# Patient Record
Sex: FEMALE | Race: WHITE | Employment: UNEMPLOYED | URBAN - METROPOLITAN AREA
[De-identification: names, ages, dates, MRNs, and addresses within clinical notes are randomized per-mention and may not be internally consistent; named-entity substitution may affect disease eponyms.]

---

## 2017-06-05 ENCOUNTER — HOSPITAL ENCOUNTER (OUTPATIENT)
Dept: CT IMAGING | Age: 52
Discharge: HOME | End: 2017-06-05
Payer: SELF-PAY

## 2017-06-05 DIAGNOSIS — Z13.6 SCREENING FOR ISCHEMIC HEART DISEASE: ICD-10-CM

## 2017-06-05 PROCEDURE — 75571 CT HRT W/O DYE W/CA TEST: CPT

## 2017-06-08 ENCOUNTER — TELEPHONE (OUTPATIENT)
Dept: CARDIAC REHAB | Age: 52
End: 2017-06-08

## 2017-06-08 NOTE — TELEPHONE ENCOUNTER
Called patient to review CT scan results. Verified patient's date of birth. Discussed results of CT scan. Her calcium score is 0. This corresponds to no plaque noted in the coronary arteries. Her risk for a heart attack is very low. The chance of patient developing heart disease at this point is less than 1%. Patient verbalized understanding of results. Will mail report to patient as she is in process of getting a new PCP. She stated that she will take the results to her new PCP at first visit.

## 2018-01-24 ENCOUNTER — OFFICE VISIT (OUTPATIENT)
Dept: ORTHOPEDIC SURGERY | Age: 53
End: 2018-01-24

## 2018-09-18 ENCOUNTER — HOSPITAL ENCOUNTER (OUTPATIENT)
Dept: LAB | Age: 53
Discharge: HOME | End: 2018-09-18

## 2018-09-18 ENCOUNTER — HOSPITAL ENCOUNTER (OUTPATIENT)
Dept: GENERAL RADIOLOGY | Age: 53
Discharge: HOME | End: 2018-09-18
Payer: COMMERCIAL

## 2018-09-18 ENCOUNTER — OFFICE VISIT (OUTPATIENT)
Dept: INTERNAL MEDICINE CLINIC | Age: 53
End: 2018-09-18

## 2018-09-18 VITALS
OXYGEN SATURATION: 98 % | SYSTOLIC BLOOD PRESSURE: 121 MMHG | HEART RATE: 82 BPM | HEIGHT: 63 IN | DIASTOLIC BLOOD PRESSURE: 81 MMHG | BODY MASS INDEX: 37.03 KG/M2 | TEMPERATURE: 96.9 F | WEIGHT: 209 LBS | RESPIRATION RATE: 16 BRPM

## 2018-09-18 DIAGNOSIS — Z12.11 SCREENING FOR COLON CANCER: ICD-10-CM

## 2018-09-18 DIAGNOSIS — E89.0 POSTOPERATIVE HYPOTHYROIDISM: ICD-10-CM

## 2018-09-18 DIAGNOSIS — M79.641 RIGHT HAND PAIN: ICD-10-CM

## 2018-09-18 DIAGNOSIS — J45.20 MILD INTERMITTENT ASTHMA WITHOUT COMPLICATION: ICD-10-CM

## 2018-09-18 DIAGNOSIS — K92.1 HEMATOCHEZIA: ICD-10-CM

## 2018-09-18 DIAGNOSIS — Z13.0 SCREENING FOR DEFICIENCY ANEMIA: ICD-10-CM

## 2018-09-18 DIAGNOSIS — E78.5 HYPERLIPIDEMIA, UNSPECIFIED HYPERLIPIDEMIA TYPE: ICD-10-CM

## 2018-09-18 DIAGNOSIS — E66.01 SEVERE OBESITY (BMI 35.0-39.9): ICD-10-CM

## 2018-09-18 DIAGNOSIS — Z12.31 ENCOUNTER FOR SCREENING MAMMOGRAM FOR BREAST CANCER: ICD-10-CM

## 2018-09-18 DIAGNOSIS — E89.0 POSTOPERATIVE HYPOTHYROIDISM: Primary | ICD-10-CM

## 2018-09-18 PROBLEM — R79.89 ABNORMAL TSH: Status: RESOLVED | Noted: 2018-09-18 | Resolved: 2018-09-18

## 2018-09-18 PROBLEM — R79.89 ABNORMAL TSH: Status: ACTIVE | Noted: 2018-09-18

## 2018-09-18 LAB — SENTARA SPECIMEN COL,SENBCF: NORMAL

## 2018-09-18 PROCEDURE — 73130 X-RAY EXAM OF HAND: CPT

## 2018-09-18 PROCEDURE — 99001 SPECIMEN HANDLING PT-LAB: CPT

## 2018-09-18 RX ORDER — PROMETHAZINE HYDROCHLORIDE 25 MG/1
25 TABLET ORAL
COMMUNITY
Start: 2016-10-19 | End: 2018-09-18 | Stop reason: ALTCHOICE

## 2018-09-18 RX ORDER — BENZONATATE 200 MG/1
200 CAPSULE ORAL
COMMUNITY
Start: 2014-03-21 | End: 2018-09-18 | Stop reason: ALTCHOICE

## 2018-09-18 RX ORDER — VALACYCLOVIR HYDROCHLORIDE 1 G/1
TABLET, FILM COATED ORAL
COMMUNITY
Start: 2018-01-08 | End: 2018-09-18 | Stop reason: ALTCHOICE

## 2018-09-18 RX ORDER — ALBUTEROL SULFATE 90 UG/1
AEROSOL, METERED RESPIRATORY (INHALATION)
COMMUNITY

## 2018-09-18 RX ORDER — DESVENLAFAXINE 100 MG/1
TABLET, EXTENDED RELEASE ORAL
COMMUNITY
Start: 2016-11-28 | End: 2018-09-18 | Stop reason: ALTCHOICE

## 2018-09-18 RX ORDER — MECLIZINE HYDROCHLORIDE 25 MG/1
25 TABLET ORAL
COMMUNITY
Start: 2016-10-19 | End: 2018-09-18 | Stop reason: ALTCHOICE

## 2018-09-18 RX ORDER — BUPROPION HYDROCHLORIDE 150 MG/1
1 TABLET ORAL
COMMUNITY
Start: 2014-03-21 | End: 2018-09-18 | Stop reason: ALTCHOICE

## 2018-09-18 RX ORDER — DIPHENHYDRAMINE HCL 25 MG
25 CAPSULE ORAL
COMMUNITY
End: 2018-09-25

## 2018-09-18 RX ORDER — LEVOTHYROXINE SODIUM 125 UG/1
TABLET ORAL
COMMUNITY
Start: 2012-03-26 | End: 2018-09-18 | Stop reason: ALTCHOICE

## 2018-09-18 RX ORDER — ZOLPIDEM TARTRATE 10 MG/1
10 TABLET ORAL
COMMUNITY
Start: 2015-08-04 | End: 2018-09-18 | Stop reason: ALTCHOICE

## 2018-09-18 RX ORDER — FLUTICASONE PROPIONATE 220 UG/1
2 AEROSOL, METERED RESPIRATORY (INHALATION)
COMMUNITY
Start: 2016-12-21 | End: 2018-09-18 | Stop reason: ALTCHOICE

## 2018-09-18 RX ORDER — PREDNISONE 20 MG/1
20 TABLET ORAL
COMMUNITY
Start: 2016-12-15 | End: 2018-09-18 | Stop reason: ALTCHOICE

## 2018-09-18 RX ORDER — INDOMETHACIN 50 MG/1
50 CAPSULE ORAL
COMMUNITY
Start: 2015-03-23 | End: 2018-09-18 | Stop reason: ALTCHOICE

## 2018-09-18 RX ORDER — PHENTERMINE HYDROCHLORIDE 37.5 MG/1
1 CAPSULE ORAL
COMMUNITY
Start: 2013-02-15 | End: 2018-09-18 | Stop reason: ALTCHOICE

## 2018-09-18 RX ORDER — LEVOTHYROXINE SODIUM 150 MCG
150-300 TABLET ORAL
COMMUNITY
Start: 2018-09-10 | End: 2019-07-24 | Stop reason: SDUPTHER

## 2018-09-18 NOTE — PROGRESS NOTES
INTERNISTS [facility]: 
9/19/2018, MRN: [id number] Amalia Antonio is a 48 y.o. female and presents to clinic to Establish Care; Hand Swelling (x 2-3 weeks right hand); and Hand Pain (x 2-3 weeks right hand) Subjective: The pt is a 54yo female with h/o HLD, asthma, and hypothyroidism (postoperative s/p thyroidectomy for multinodular goiter hx; followed by [facility] Endocrinology team). 1. Hypothyroidism: She has a h/o abnormal TSH per review of the EHRs ([facility]) in 2016. Her TSH was in the 6 range. Her fT4 and fT3 were unremarkable at that time. She has had hypothyroidism for yrs. She is followed every 6 months by Endocrinology at [facility]. She has a multinodular goiter hx. S/p thyroidectomy. 2. HLD: Her cholesterol in 2015 was >200 per review of the [facility]. Her weight is 209 lbs today. 3. Asthma: Present for yrs. She used to take flovent. She rarely uses her albuterol inhaler. She gets \"bronchiitis\" every autumn and needs cough rx and albuterol prn. No h/o allergic rhinitis. She has cats/dogs. 4. Health Maintenance: 
- Last mammogram: 3/18. Unremarkable per pt hx. No breast pain/masses. - Last colon cancer screening: Not done. She had an episode of BRBPR (which has never happened before) 6 months ago. Not associated with cramping. She had BRBPR in the toilet bowl and not on the paper with wiping.  
- Last PAP: S/p hysterectomy. No bleeding since then. - Diet: No restrictions. - Exercise: She does not regularly exercise - Tobacco use: None 
- ETOH use: Rarely she will have a wine cooler twice a month - Drug use: None - Energy drink consumption: None 5. Right Hand Pain: About a yr ago, she had a cat bite along her right hand. She received abx and a Tdap. Sx resolved with these rx. She reports a 3wk h/o pain along her 3rd and 4th MCP jt. No h/o trauma since the cat bite. No alleviating factors are known. Symptoms not worsening. They are persisting. Patient Active Problem List  
 Diagnosis Date Noted  Hyperlipidemia 09/18/2018  Postoperative hypothyroidism 09/18/2018  Mild intermittent asthma without complication [id number]/0264 Current Outpatient Prescriptions Medication Sig Dispense Refill  albuterol (PROVENTIL HFA, VENTOLIN HFA, PROAIR HFA) 90 mcg/actuation inhaler Inhale 2 puffs into the lungs every 6 hours as needed.  levothyroxine (SYNTHROID) 150 mcg tablet Take 1 tablet Mon-Sat and 1.5 tabs on Sunday. Take 30 minutes to 1 hour before breakfast    
 diphenhydrAMINE (BENADRYL) 25 mg capsule Take 25 mg by mouth every six (6) hours as needed. Allergies Allergen Reactions  Codeine Nausea and Vomiting and Other (comments)  Methylphenidate Hcl Other (comments) Heart palpations  Morphine Nausea Only  Other Medication Nausea and Vomiting All opiods  Penicillin G Other (comments)  Penicillins Other (comments) Pt doesn't recall reaction. Was told she had a reaction when she was a child Past Medical History:  
Diagnosis Date  Abnormal EKG  Abnormal Pap smear of cervix  Anemia  Asthma  Depression  Elevated BP without diagnosis of hypertension  Headache  Hypercholesterolemia  Thyroid disease Past Surgical History:  
Procedure Laterality Date  HX APPENDECTOMY  HX ADAMA AND BSO  HX THYROIDECTOMY  HX WISDOM TEETH EXTRACTION    
 x4 Family History Problem Relation Age of Onset  Parkinson's Disease Mother  Dementia Mother  Thyroid Cancer Mother [address] Cataract Mother  Hypertension Father  Diabetes Father  Arthritis-osteo Father  Atrial Fibrillation Brother  Hypertension Brother  Macular Degen Maternal Grandmother  Thyroid Cancer Maternal Grandmother  Cataract Maternal Grandmother  No Known Problems Maternal Grandfather  Arthritis-rheumatoid Paternal Grandmother  Diabetes Paternal Grandfather  Attention Deficit Hyperactivity Disorder Son  Anxiety Daughter  Thyroid Disease Daughter Social History Substance Use Topics  Smoking status: Never Smoker  Smokeless tobacco: Never Used  Alcohol use 0.0 - 0.6 oz/week 0 - 1 Glasses of wine per week ROS Review of Systems Constitutional: Negative for chills and fever. HENT: Negative for ear pain and sore throat. Eyes: Negative for blurred vision and pain. Respiratory: Negative for cough and shortness of breath. Cardiovascular: Negative for chest pain. Gastrointestinal: Negative for abdominal pain, blood in stool and melena. Genitourinary: Negative for dysuria. Musculoskeletal: Positive for joint pain. Negative for myalgias. Skin: Negative for rash. Neurological: Negative for tingling, focal weakness and headaches. Endo/Heme/Allergies: Does not bruise/bleed easily. Psychiatric/Behavioral: Negative for substance abuse. Objective Vitals:  
 09/18/18 1357 BP: 121/81 Pulse: 82 Resp: 16 Temp: 96.9 °F (36.1 °C) TempSrc: Oral  
SpO2: 98% Weight: 209 lb (94.8 kg) Height: 5' 2.99\" (1.6 m) PainSc:   3 PainLoc: Hand Physical Exam  
Constitutional: She is oriented to person, place, and time and well-developed, well-nourished, and in no distress. HENT:  
Head: Normocephalic and atraumatic. Right Ear: External ear normal.  
Left Ear: External ear normal.  
Nose: Nose normal.  
Mouth/Throat: Oropharynx is clear and moist. No oropharyngeal exudate. Clear TMs Eyes: Conjunctivae and EOM are normal. Pupils are equal, round, and reactive to light. Right eye exhibits no discharge. Left eye exhibits no discharge. No scleral icterus. Neck: Neck supple. Cardiovascular: Normal rate, regular rhythm, normal heart sounds and intact distal pulses. Exam reveals no gallop and no friction rub. No murmur heard. Pulmonary/Chest: Effort normal and breath sounds normal. No respiratory distress. She has no wheezes. She has no rales. Abdominal: Soft. Bowel sounds are normal. She exhibits no distension. There is no tenderness. There is no rebound and no guarding. Musculoskeletal: She exhibits no edema or tenderness (BUE except along the right hand 3rd and 4th MCP jts). No effusions. No prominence along MCP jts Lymphadenopathy:  
  She has no cervical adenopathy. Neurological: She is alert and oriented to person, place, and time. She exhibits normal muscle tone. Gait normal.  
Skin: Skin is warm and dry. No erythema. Psychiatric: Affect normal.  
Nursing note and vitals reviewed. Assessment/Plan: 1. Hyperlipidemia: Per [employer] EHR results. Weight is 209lbs today. Checking a lipid panel, CMP, and A1c. I encouraged her to reduce her processed food intake and to exercise regularly. I will recheck her weight at her follow-up appointment. ORDERS: 
- LIPID PANEL; Future - METABOLIC PANEL, COMPREHENSIVE; Future 
- HEMOGLOBIN A1C W/O EAG; Future 2. Health Maintenance: 
Ordering a CBC to screen for anemia Requesting her previous PCP records, vaccine records, last mammogram, and last Pap. Ordering a mammogram to screen for breast cancer. ORDERS: 
- CBC WITH AUTOMATED DIFF; Future - [facility] MAMMO BI SCREENING INCL CAD; Future 3. Right hand pain: PE findings are reassuring. Overuse injury? Checking a CRP, RF, ELOY, uric acid level, a right hand x-ray for completeness. Placing a referral to orthopedicsper pt request. Activity as tolerated. ORDERS: 
- C REACTIVE PROTEIN, QT; Future 
- RHEUMASSURE; Future - ELOY COMPREHENSIVE PANEL; Future - URIC ACID; Future - XR HAND RT MIN 3 V; Future Ken Estimable Hand [facility] 4. Postoperative hypothyroidism: S/p thyroidectomy for multinodular goiter hx. Checking TFTs, a CBC, and a CMP. Continue with Synthroid as prescribed. I encouraged her to continue following up with her Endocrinology team. 
Continue with albuterol as needed. Observation. ORDERS: 
- TSH AND FREE T4; Future - CBC WITH AUTOMATED DIFF; Future - METABOLIC PANEL, COMPREHENSIVE; Future - T3, FREE; Future 5. Mild intermittent asthma without complication: Stable. 6. BRBPR Hx: Also, she is overdue for colon cancer screening. Occurred 6 months ago in the absence of abdominal cramping and in the absence of straining/constipation sx. Placing a referral to GI for colonoscopy. ORDERS: 
- REFERRAL TO GASTROENTEROLOGY Health Maintenance Due Topic Date Due  Pneumococcal 19-64 Medium Risk (1 of 1 - PPSV23) 03/08/1986  
 DTaP/Tdap/Td series (1 - Tdap) 03/08/1988  PAP AKA CERVICAL CYTOLOGY  03/08/1988  BREAST CANCER SCRN MAMMOGRAM  03/08/2017  
 FOBT Q 1 YEAR AGE 50-75  03/08/2017  Influenza Age 5 to Adult  08/01/2018 Lab review: Labs are reviewed in the [facility] EHR. Labs ordered as mentioned above. I have discussed the diagnosis with the patient and the intended plan as seen in the above orders. The patient has received an after-visit summary and questions were answered concerning future plans. I have discussed medication side effects and warnings with the patient as well. I have reviewed the plan of care with the patient, accepted their input and they are in agreement with the treatment goals. All questions were answered. The patient understands the plan of care. Handouts provided today with above information. Pt instructed if symptoms worsen to call the office or report to the ED for continued care. Greater than 50% of the visit time was spent in counseling and/or coordination of care. Voice recognition was used to generate this report, which may have resulted in some phonetic based errors in grammar and contents.  Even though attempts were made to correct all the mistakes, some may have been missed, and remained in the body of the document. Follow-up Disposition: 
Return in about 4 weeks (around 10/16/2018) for lab results.  
 
Beverley Alonso MD

## 2018-09-18 NOTE — PATIENT INSTRUCTIONS
Patient was given a copy of the [employer] Direct[employer] form and understands to bring it in once completed. Health Maintenance Due Topic Date Due  
 DTaP/Tdap/Td series (1 - Tdap) 03/08/1988  PAP AKA CERVICAL CYTOLOGY  03/08/1988  BREAST CANCER SCRN MAMMOGRAM  03/08/2017  
 FOBT Q 1 YEAR AGE 50-75  03/08/2017  Influenza Age 5 to Adult  08/01/2018 Body Mass Index: Care Instructions Your Care Instructions Body mass index (BMI) can help you see if your weight is raising your risk for health problems. It uses a formula to compare how much you weigh with how tall you are. · A BMI lower than 18.5 is considered underweight. · A BMI between 18.5 and 24.9 is considered healthy. · A BMI between 25 and 29.9 is considered overweight. A BMI of 30 or higher is considered obese. If your BMI is in the normal range, it means that you have a lower risk for weight-related health problems. If your BMI is in the overweight or obese range, you may be at increased risk for weight-related health problems, such as high blood pressure, heart disease, stroke, arthritis or joint pain, and diabetes. If your BMI is in the underweight range, you may be at increased risk for health problems such as fatigue, lower protection (immunity) against illness, muscle loss, bone loss, hair loss, and hormone problems. BMI is just one measure of your risk for weight-related health problems. You may be at higher risk for health problems if you are not active, you eat an unhealthy diet, or you drink too much alcohol or use tobacco products. Follow-up care is a key part of your treatment and safety. Be sure to make and go to all appointments, and call your doctor if you are having problems. It's also a good idea to know your test results and keep a list of the medicines you take. How can you care for yourself at home? · Practice healthy eating habits.  This includes eating plenty of fruits, vegetables, whole grains, lean protein, and low-fat dairy. · If your doctor recommends it, get more exercise. Walking is a good choice. Bit by bit, increase the amount you walk every day. Try for at least 30 minutes on most days of the week. · Do not smoke. Smoking can increase your risk for health problems. If you need help quitting, talk to your doctor about stop-smoking programs and medicines. These can increase your chances of quitting for good. · Limit alcohol to 2 drinks a day for men and 1 drink a day for women. Too much alcohol can cause health problems. If you have a BMI higher than 25 · Your doctor may do other tests to check your risk for weight-related health problems. This may include measuring the distance around your waist. A waist measurement of more than 40 inches in men or 35 inches in women can increase the risk of weight-related health problems. · Talk with your doctor about steps you can take to stay healthy or improve your health. You may need to make lifestyle changes to lose weight and stay healthy, such as changing your diet and getting regular exercise. If you have a BMI lower than 18.5 · Your doctor may do other tests to check your risk for health problems. · Talk with your doctor about steps you can take to stay healthy or improve your health. You may need to make lifestyle changes to gain or maintain weight and stay healthy, such as getting more healthy foods in your diet and doing exercises to build muscle. Where can you learn more? Go to http://kaylyn-fara.info/. Enter S176 in the search box to learn more about \"Body Mass Index: Care Instructions. \" Current as of: October 9, 2017 Content Version: 11.7 © 2849-3471 [employer]. Care instructions adapted under license by [employer] (which disclaims liability or warranty for this information).  If you have questions about a medical condition or this instruction, always ask your healthcare professional. [place] [address] any warranty or liability for your use of this information.

## 2018-09-18 NOTE — MR AVS SNAPSHOT
[address] [facility] 
 
 
 [address], [address] [place] [address] [facility] 
[phone] Patient: Shawna Baldwin MRN: [id number] :1965 Visit Information Date & Time Provider Department Dept. Phone Encounter #  
 2018  2:00 PM Sara Geiger MD [facility] [id number] Follow-up Instructions Return in about 4 weeks (around 10/16/2018) for lab results. Your Appointments 10/19/2018  9:00 AM  
Office Visit with Sara Geiger MD  
[facility]: 4 week f/u  
 [address] [facility], [place] [address] [address]  
  
   
 [address], [address] [facility] Upcoming Health Maintenance Date Due DTaP/Tdap/Td series (1 - Tdap) 3/8/1988 PAP AKA CERVICAL CYTOLOGY 3/8/1988 BREAST CANCER SCRN MAMMOGRAM 3/8/2017 FOBT Q 1 YEAR AGE 50-75 3/8/2017 Influenza Age 5 to Adult 2018 Allergies as of 2018  Review Complete On: 2018 By: Sara Geiger MD  
  
 Severity Noted Reaction Type Reactions Codeine High 2008    Nausea and Vomiting, Other (comments) Methylphenidate Hcl High 2011    Other (comments) Heart palpations Morphine High 2011    Nausea Only Other Medication High 2011    Nausea and Vomiting All opiods Penicillin G High 2008    Other (comments) Penicillins High 2011    Other (comments) Pt doesn't recall reaction. Was told she had a reaction when she was a child Current Immunizations  Never Reviewed No immunizations on file. Not reviewed this visit You Were Diagnosed With   
  
 Codes Comments Postoperative hypothyroidism    -  Primary ICD-10-CM: E89.0 ICD-9-CM: 244.0 Hyperlipidemia, unspecified hyperlipidemia type     ICD-10-CM: E78.5 ICD-9-CM: 272.4 Screening for deficiency anemia     ICD-10-CM: Z13.0 ICD-9-CM: V78.1 Right hand pain     ICD-10-CM: M79.641 ICD-9-CM: 729.5 Mild intermittent asthma without complication     ZDT-31-ZH: J45.20 ICD-9-CM: 493.90 Screening for colon cancer     ICD-10-CM: Z12.11 ICD-9-CM: V76.51 Encounter for screening mammogram for breast cancer     ICD-10-CM: Z12.31 
ICD-9-CM: V76.12 Vitals BP Pulse Temp Resp Height(growth percentile) Weight(growth percentile) 121/81 (BP 1 Location: Right arm, BP Patient Position: Sitting) 82 96.9 °F (36.1 °C) (Oral) 16 5' 2.99\" (1.6 m) 209 lb (94.8 kg) SpO2 BMI OB Status Smoking Status 98% 37.03 kg/m2 Hysterectomy Never Smoker BMI and BSA Data Body Mass Index Body Surface Area  
 37.03 kg/m 2 2.05 m 2 Your Updated Medication List  
  
   
This list is accurate as of 9/18/18  3:22 PM.  Always use your most recent med list.  
  
  
  
  
 albuterol 90 mcg/actuation inhaler Commonly known as:  PROVENTIL HFA, VENTOLIN HFA, PROAIR HFA Inhale 2 puffs into the lungs every 6 hours as needed. BENADRYL 25 mg capsule Generic drug:  diphenhydrAMINE Take 25 mg by mouth every six (6) hours as needed. SYNTHROID 150 mcg tablet Generic drug:  levothyroxine Take 1 tablet Mon-Sat and 1.5 tabs on Sunday. Take 30 minutes to 1 hour before breakfast  
  
  
  
  
We Performed the Following REFERRAL TO GASTROENTEROLOGY [XJQ02 Custom] Follow-up Instructions Return in about 4 weeks (around 10/16/2018) for lab results. To-Do List   
 09/18/2018 Lab:  ELOY COMPREHENSIVE PANEL   
  
 09/18/2018 Lab:  C REACTIVE PROTEIN, QT   
  
 09/18/2018 Lab:  CBC WITH AUTOMATED DIFF Around 09/18/2018 Lab:  HEMOGLOBIN A1C W/O EAG   
  
 09/18/2018 Lab:  LIPID PANEL   
  
 09/18/2018 Lab:  METABOLIC PANEL, COMPREHENSIVE   
  
 09/18/2018 Lab:  [facility]   
  
 09/18/2018 Lab:  T3, FREE Around 09/18/2018   Lab:  TSH AND FREE T4   
  
 09/18/2018 Lab:  URIC ACID   
  
 09/18/2018 Imaging:  XR HAND RT MIN 3 V   
  
 04/09/2019 Imaging:  JEROME MAMMO BI SCREENING INCL CAD Referral Information Referral ID Referred By Referred To  
  
 [id number] RAUDEL [id number] [address] [facility] [place] [address], [address] Phone: [phone] Fax: [phone] Visits Status Start Date End Date 1 New Request 9/18/18 9/18/19 If your referral has a status of pending review or denied, additional information will be sent to support the outcome of this decision. Patient Instructions Patient was given a copy of the Advanced Medical Directive form and understands to bring it in once completed. Health Maintenance Due Topic Date Due  
 DTaP/Tdap/Td series (1 - Tdap) 03/08/1988  PAP AKA CERVICAL CYTOLOGY  03/08/1988  BREAST CANCER SCRN MAMMOGRAM  03/08/2017  
 FOBT Q 1 YEAR AGE 50-75  03/08/2017  Influenza Age 5 to Adult  08/01/2018 Body Mass Index: Care Instructions Your Care Instructions Body mass index (BMI) can help you see if your weight is raising your risk for health problems. It uses a formula to compare how much you weigh with how tall you are. · A BMI lower than 18.5 is considered underweight. · A BMI between 18.5 and 24.9 is considered healthy. · A BMI between 25 and 29.9 is considered overweight. A BMI of 30 or higher is considered obese. If your BMI is in the normal range, it means that you have a lower risk for weight-related health problems. If your BMI is in the overweight or obese range, you may be at increased risk for weight-related health problems, such as high blood pressure, heart disease, stroke, arthritis or joint pain, and diabetes.  If your BMI is in the underweight range, you may be at increased risk for health problems such as fatigue, lower protection (immunity) against illness, muscle loss, bone loss, hair loss, and hormone problems. BMI is just one measure of your risk for weight-related health problems. You may be at higher risk for health problems if you are not active, you eat an unhealthy diet, or you drink too much alcohol or use tobacco products. Follow-up care is a key part of your treatment and safety. Be sure to make and go to all appointments, and call your doctor if you are having problems. It's also a good idea to know your test results and keep a list of the medicines you take. How can you care for yourself at home? · Practice healthy eating habits. This includes eating plenty of fruits, vegetables, whole grains, lean protein, and low-fat dairy. · If your doctor recommends it, get more exercise. Walking is a good choice. Bit by bit, increase the amount you walk every day. Try for at least 30 minutes on most days of the week. · Do not smoke. Smoking can increase your risk for health problems. If you need help quitting, talk to your doctor about stop-smoking programs and medicines. These can increase your chances of quitting for good. · Limit alcohol to 2 drinks a day for men and 1 drink a day for women. Too much alcohol can cause health problems. If you have a BMI higher than 25 · Your doctor may do other tests to check your risk for weight-related health problems. This may include measuring the distance around your waist. A waist measurement of more than 40 inches in men or 35 inches in women can increase the risk of weight-related health problems. · Talk with your doctor about steps you can take to stay healthy or improve your health. You may need to make lifestyle changes to lose weight and stay healthy, such as changing your diet and getting regular exercise. If you have a BMI lower than 18.5 · Your doctor may do other tests to check your risk for health problems.  
· Talk with your doctor about steps you can take to stay healthy or improve your health. You may need to make lifestyle changes to gain or maintain weight and stay healthy, such as getting more healthy foods in your diet and doing exercises to build muscle. Where can you learn more? Go to http://kaylyn-fara.info/. Enter S176 in the search box to learn more about \"Body Mass Index: Care Instructions. \" Current as of: October 9, 2017 Content Version: 11.7 © 1359-0556 [employer]. Care instructions adapted under license by [employer] (which disclaims liability or warranty for this information). If you have questions about a medical condition or this instruction, always ask your healthcare professional. [address] any warranty or liability for your use of this information. Introducing hospitals & HEALTH SERVICES! Maday Morrison introduces [employer] patient portal. Now you can access parts of your medical record, email your doctor's office, and request medication refills online. 1. In your internet browser, go to [email or web address] 2. Click on the First Time User? Click Here link in the Sign In box. You will see the New Member Sign Up page. 3. Enter your [employer] Access Code exactly as it appears below. You will not need to use this code after youve completed the sign-up process. If you do not sign up before the expiration date, you must request a new code. · [employer] Access Code: [email or web address] Expires: 12/17/2018  1:38 PM 
 
4. Enter the last four digits of your Social Security Number (xxxx) and Date of Birth (mm/dd/yyyy) as indicated and click Submit. You will be taken to the next sign-up page. 5. Create a [employer] ID. This will be your [employer] login ID and cannot be changed, so think of one that is secure and easy to remember. 6. Create a [employer] password. You can change your password at any time. 7. Enter your Password Reset Question and Answer.  This can be used at a later time if you forget your password. 8. Enter your e-mail address. You will receive e-mail notification when new information is available in [address] 9. Click Sign Up. You can now view and download portions of your medical record. 10. Click the Download Summary menu link to download a portable copy of your medical information. If you have questions, please visit the Frequently Asked Questions section of the Snapeee website. Remember, [employer]ee is NOT to be used for urgent needs. For medical emergencies, dial 911. Now available from your iPhone and Android! Please provide this summary of care documentation to your next provider. Your primary care clinician is listed as Thony Bee. If you have any questions after today's visit, please call [phone].

## 2018-09-18 NOTE — PROGRESS NOTES
Chief Complaint Patient presents with [facility] Establish Care  Hand Swelling  
  x 2-3 weeks right hand  Hand Pain  
  x 2-3 weeks right hand Patient was given a copy of the Advanced Medical Directive form and understands to bring it in once completed. 1. Have you been to the ER, urgent care clinic since your last visit? Hospitalized since your last visit? No 
 
2. Have you seen or consulted any other health care providers outside of the [address] [place] [address] since your last visit? Include any pap smears or colon screening.  No

## 2018-09-19 ENCOUNTER — TELEPHONE (OUTPATIENT)
Dept: INTERNAL MEDICINE CLINIC | Age: 53
End: 2018-09-19

## 2018-09-19 PROBLEM — E66.01 SEVERE OBESITY (BMI 35.0-39.9): Status: ACTIVE | Noted: 2018-09-19

## 2018-09-19 PROBLEM — M79.641 RIGHT HAND PAIN: Status: ACTIVE | Noted: 2018-09-19

## 2018-09-19 NOTE — LETTER
September 19, 2018 Angelica Kang [address] [facility] 250 [facility] [address] Dear Zion Quezada: Thank you for requesting access to [employer]. Please follow the instructions below to securely access and download your online medical record. [employer] allows you to send messages to your doctor, view your test results, renew your prescriptions, schedule appointments, and more. How Do I Sign Up? 1. In your internet browser, go to [email or web address]  
2. Click on the First Time User? Click Here link in the Sign In box. You will be redirected to the New Member Sign Up page. 3. Enter your [employer] Access Code exactly as it appears below. You will not need to use this code after youve completed the sign-up process. If you do not sign up before the expiration date, you must request a new code. [employer] Access Code: [email or web address] Expires: 12/17/2018  1:38 PM  
 
4. Enter the last four digits of your Social Security Number (xxxx) and Date of Birth (mm/dd/yyyy) as indicated and click Submit. You will be taken to the next sign-up page. 5. Create a [employer] ID. This will be your [employer] login ID and cannot be changed, so think of one that is secure and easy to remember. 6. Create a [employer] password. You can change your password at any time. 7. Enter your Password Reset Question and Answer. This can be used at a later time if you forget your password. 8. Enter your e-mail address. You will receive e-mail notification when new information is available in [address] 9. Click Sign Up. You can now view and download portions of your medical record. 10. Click the Download Summary menu link to download a portable copy of your medical information. Additional Information If you have questions, please visit the Frequently Asked Questions section of the [employer] website at [email or web address] Remember, [employer] is NOT to be used for urgent needs. For medical emergencies, dial 911. Now available from your iPhone and Android! Sincerely, Rafael Philip

## 2018-09-19 NOTE — PROGRESS NOTES
Please let her know that her xray does not show any evidence of arthritis. If she continues to have pain, I will refer her to Orthopedics for evaluation.     Dr. Noreen Young  [facility] [address], [address] [place] [address] Str.  Phone: [phone]  Fax: [phone]

## 2018-09-19 NOTE — TELEPHONE ENCOUNTER
Chief Complaint   Patient presents with    Results     Xray Right hand done 9-18-18 per Dr Medina Vaughn     Please let her know that her xray does not show any evidence of arthritis. If she continues to have pain, I will refer her to Orthopedics for evaluation. Patient reached and informed of results, she is wanting to be referred out to Orthopedics for further evaluation. The patient understands this request will be made to Dr Medina Vaughn, and to give our Orthopedic Team 3-5 business days to reach out to her to call and make her New Patient appointment. The patient also requesting her MyChart Activation Letter, and understands this will be mailed to her home today.

## 2018-09-25 ENCOUNTER — OFFICE VISIT (OUTPATIENT)
Dept: INTERNAL MEDICINE CLINIC | Age: 53
End: 2018-09-25

## 2018-09-25 VITALS
TEMPERATURE: 98.2 F | BODY MASS INDEX: 36.71 KG/M2 | HEIGHT: 63 IN | WEIGHT: 207.2 LBS | HEART RATE: 100 BPM | OXYGEN SATURATION: 97 % | DIASTOLIC BLOOD PRESSURE: 81 MMHG | RESPIRATION RATE: 12 BRPM | SYSTOLIC BLOOD PRESSURE: 123 MMHG

## 2018-09-25 DIAGNOSIS — J98.8 RESPIRATORY TRACT INFECTION: Primary | ICD-10-CM

## 2018-09-25 RX ORDER — PROMETHAZINE HYDROCHLORIDE AND DEXTROMETHORPHAN HYDROBROMIDE 6.25; 15 MG/5ML; MG/5ML
5 SYRUP ORAL
Qty: 118 ML | Refills: 0 | Status: SHIPPED | OUTPATIENT
Start: 2018-09-25 | End: 2019-04-30 | Stop reason: ALTCHOICE

## 2018-09-25 RX ORDER — IBUPROFEN 200 MG
800 CAPSULE ORAL
COMMUNITY

## 2018-09-25 NOTE — MR AVS SNAPSHOT
[address] [facility] 
 
 
 [address] [facility], [facility] [address] [facility] 
[phone] Patient: Cheng Ross MRN: [id number] :1965 Visit Information Date & Time Provider Department Dept. Phone Encounter #  
 2018  2:00 PM Vivien Elizalde MD [facility] [phone] Follow-up Instructions Return if symptoms worsen or fail to improve. Your Appointments 10/4/2018  3:20 PM  
New Patient with Humberto Umana, [address] ([facility]) Appt Note: right hand pain/ ref by Heron Mcdowell/ xrays in [facility] the patient to come 30 minutes prior to their appointment with their picture I.D, Insurance cards and a list of their medications & dosage to the \[facility] [place] [facility]\"" location [place] [address], Suite [facility] [address] [facility]  
[phone] [facility], [address]  
  
    
 10/19/2018  9:00 AM  
Office Visit with Vivien Elizalde MD  
[facility] Appt Note: 4 week f/u  
 [address] [facility], [facility] [address] [address] [address]  
  
   
 [address], [address] Upcoming Health Maintenance Date Due Pneumococcal 19-64 Medium Risk (1 of 1 - PPSV23) 3/8/1986 DTaP/Tdap/Td series (1 - Tdap) 3/8/1988 PAP AKA CERVICAL CYTOLOGY 3/8/1988 Shingrix Vaccine Age 50> (1 of 2) 3/8/2017 BREAST CANCER SCRN MAMMOGRAM 3/8/2017 FOBT Q 1 YEAR AGE 50-75 3/8/2017 Influenza Age 5 to Adult 2018 Allergies as of 2018  Review Complete On: 2018 By: Jessee Sales Severity Noted Reaction Type Reactions Codeine High 2008    Nausea and Vomiting, Other (comments) Methylphenidate Hcl High 2011    Other (comments) Heart palpations Morphine High 2011    Nausea Only Other Medication High 09/30/2011    Nausea and Vomiting All opiods Penicillin G High 08/14/2008    Other (comments) Penicillins High 04/22/2011    Other (comments) Pt doesn't recall reaction. Was told she had a reaction when she was a child Current Immunizations  Never Reviewed No immunizations on file. Not reviewed this visit You Were Diagnosed With   
  
 Codes Comments Respiratory tract infection    -  Primary ICD-10-CM: J98.8 ICD-9-CM: 519.8 Vitals BP Pulse Temp Resp Height(growth percentile) Weight(growth percentile) 123/81 (BP 1 Location: Right arm, BP Patient Position: Sitting) 100 98.2 °F (36.8 °C) (Oral) 12 5' 2.9\" (1.598 m) 207 lb 3.2 oz (94 kg) SpO2 BMI OB Status Smoking Status 97% 36.82 kg/m2 Hysterectomy Never Smoker BMI and BSA Data Body Mass Index Body Surface Area  
 36.82 kg/m 2 2.04 m 2 Preferred Pharmacy Pharmacy Name Phone [facility] [address], [address] [facility] [phone] Your Updated Medication List  
  
   
This list is accurate as of 9/25/18  2:59 PM.  Always use your most recent med list.  
  
  
  
  
 albuterol 90 mcg/actuation inhaler Commonly known as:  PROVENTIL HFA, VENTOLIN HFA, PROAIR HFA Inhale 2 puffs into the lungs every 6 hours as needed. ibuprofen 200 mg Cap Take 800 mg by mouth three (3) times daily as needed. promethazine-dextromethorphan 6.25-15 mg/5 mL syrup Commonly known as:  PROMETHAZINE-DM Take 5 mL by mouth every four (4) hours as needed for Cough. Indications: Cough SYNTHROID 150 mcg tablet Generic drug:  levothyroxine Take 1 tablet Mon-Sat and 1.5 tabs on Sunday. Take 30 minutes to 1 hour before breakfast  
  
  
  
  
Prescriptions Printed Refills  
 promethazine-dextromethorphan (PROMETHAZINE-DM) 6.25-15 mg/5 mL syrup 0 Sig: Take 5 mL by mouth every four (4) hours as needed for Cough. Indications: Cough Class: Print Route: Oral  
  
Follow-up Instructions Return if symptoms worsen or fail to improve. To-Do List   
 04/09/2019 11:00 AM  
  Appointment with CRISTAL MO at [address] [place] [address] ([phone]) PAYMENT  For Non-Medicare patients - $15.00 will be collected from you at the time of your exam.  You will be billed $35.00 from the reading [facility]. OUTSIDE FILMS  - Any outside films related to the study being scheduled should be brought with you on the day of the exam.  If this cannot be done there may be a delay in the reading of the study. MEDICATIONS  - Patient must bring a complete list of all medications currently taking to include prescriptions, over-the-counter meds, herbals, vitamins & any dietary supplements  GENERAL INSTRUCTIONS  - On the day of your exam do not use any bath powder, deodorant or lotions on the armpit area. -Tenderness of breasts may cause an increase of discomfort during procedure. If you are experiencing breast tenderness on the day of your appointment and would like to reschedule, please call [phone]. Patient Instructions Health Maintenance Due Topic Date Due  Pneumococcal 19-64 Medium Risk (1 of 1 - PPSV23) 03/08/1986  
 DTaP/Tdap/Td series (1 - Tdap) 03/08/1988  PAP AKA CERVICAL CYTOLOGY  03/08/1988  Shingrix Vaccine Age 50> (1 of 2) 03/08/2017  BREAST CANCER SCRN MAMMOGRAM  03/08/2017  
 FOBT Q 1 YEAR AGE 50-75  03/08/2017  Influenza Age 5 to Adult  08/01/2018 Introducing [facility] & HEALTH SERVICES! Adrian Pérez introduces [employer] patient portal. Now you can access parts of your medical record, email your doctor's office, and request medication refills online. 1. In your internet browser, go to [email or web address] 2. Click on the First Time User? Click Here link in the Sign In box. You will see the New Member Sign Up page. 3. Enter your [employer] Access Code exactly as it appears below. You will not need to use this code after youve completed the sign-up process. If you do not sign up before the expiration date, you must request a new code. · [employer] Access Code: [email or web address] Expires: 12/17/2018  1:38 PM 
 
4. Enter the last four digits of your Social Security Number (xxxx) and Date of Birth (mm/dd/yyyy) as indicated and click Submit. You will be taken to the next sign-up page. 5. Create a [employer]IX ID. This will be your [employer] login ID and cannot be changed, so think of one that is secure and easy to remember. 6. Create a [employer] password. You can change your password at any time. 7. Enter your Password Reset Question and Answer. This can be used at a later time if you forget your password. 8. Enter your e-mail address. You will receive e-mail notification when new information is available in [address] 9. Click Sign Up. You can now view and download portions of your medical record. 10. Click the Download Summary menu link to download a portable copy of your medical information. If you have questions, please visit the Frequently Asked Questions section of the [employer] website. Remember, [employer] is NOT to be used for urgent needs. For medical emergencies, dial 911. Now available from your iPhone and Android! Please provide this summary of care documentation to your next provider. Your primary care clinician is listed as Lenard Sarmiento. If you have any questions after today's visit, please call [phone].

## 2018-09-25 NOTE — PROGRESS NOTES
INTERNISTS OF [facility]: 
9/25/2018, MRN: [id number] Jordan Cantu is a 48 y.o. female and presents to clinic for Cough (x 5 days  and was exposed to someone in the family that had Pneumonia ) and Hoarse Subjective: The pt is a 54yo female with h/o HLD, asthma, and hypothyroidism (postoperative s/p thyroidectomy for multinodular goiter hx; followed by [facility] Endocrinology team). Cough: Present x 2 days. +Promethazine syrup (from >1 yr ago) helped to relieve her cough sx. She has a sore throat with coughing spells. No sputum. Using her albuterol inhaler helps to relieve her cough. No fever/chills. No shortness of breath. No ear pain or eye pain. A family member was just diagnosed with pneumonia. She also has associated hoarseness with her symptoms. Patient Active Problem List  
 Diagnosis Date Noted  Right hand pain 09/19/2018  Severe obesity (BMI 35.0-39.9) ([facility] [address] 09/19/2018  Hyperlipidemia 09/18/2018  Postoperative hypothyroidism 09/18/2018  Mild intermittent asthma without complication 45/95/1047 Current Outpatient Prescriptions Medication Sig Dispense Refill  ibuprofen 200 mg cap Take 800 mg by mouth three (3) times daily as needed.  promethazine-dextromethorphan (PROMETHAZINE-DM) 6.25-15 mg/5 mL syrup Take 5 mL by mouth every four (4) hours as needed for Cough. Indications: Cough 118 mL 0  
 albuterol (PROVENTIL HFA, VENTOLIN HFA, PROAIR HFA) 90 mcg/actuation inhaler Inhale 2 puffs into the lungs every 6 hours as needed.  levothyroxine (SYNTHROID) 150 mcg tablet Take 1 tablet Mon-Sat and 1.5 tabs on Sunday. Take 30 minutes to 1 hour before breakfast    
 
 
Allergies Allergen Reactions  Codeine Nausea and Vomiting and Other (comments)  Methylphenidate Hcl Other (comments) Heart palpations  Morphine Nausea Only  Other Medication Nausea and Vomiting All opiods  Penicillin G Other (comments)  Penicillins Other (comments) Pt doesn't recall reaction. Was told she had a reaction when she was a child Past Medical History:  
Diagnosis Date  Abnormal EKG  Abnormal Pap smear of cervix  Anemia  Asthma  Depression  Elevated BP without diagnosis of hypertension  Headache  Hypercholesterolemia  Thyroid disease Past Surgical History:  
Procedure Laterality Date  HX APPENDECTOMY  HX ADAMA AND BSO  HX THYROIDECTOMY  HX WISDOM TEETH EXTRACTION    
 x4 Family History Problem Relation Age of Onset  Parkinson's Disease Mother  Dementia Mother  Thyroid Cancer Mother [employer] Cataract Mother  Hypertension Father  Diabetes Father  Arthritis-osteo Father  Atrial Fibrillation Brother  Hypertension Brother  Macular Degen Maternal Grandmother  Thyroid Cancer Maternal Grandmother  Cataract Maternal Grandmother  No Known Problems Maternal Grandfather  Arthritis-rheumatoid Paternal Grandmother  Diabetes Paternal Grandfather  Attention Deficit Hyperactivity Disorder Son  Anxiety Daughter  Thyroid Disease Daughter Social History Substance Use Topics  Smoking status: Never Smoker  Smokeless tobacco: Never Used  Alcohol use 0.0 - 0.6 oz/week 0 - 1 Glasses of wine per week ROS Review of Systems Constitutional: Positive for malaise/fatigue. Negative for chills and fever. HENT: Negative for ear pain and sore throat. Eyes: Negative for blurred vision and pain. Respiratory: Positive for cough. Negative for sputum production and shortness of breath. Cardiovascular: Negative for chest pain. Gastrointestinal: Negative for abdominal pain, blood in stool and melena. Genitourinary: Negative for dysuria and hematuria. Musculoskeletal: Positive for joint pain (right hand pain). Negative for myalgias. Skin: Negative for rash. Neurological: Negative for tingling, focal weakness and headaches. Endo/Heme/Allergies: Does not bruise/bleed easily. Psychiatric/Behavioral: Negative for substance abuse. Objective Vitals:  
 09/25/18 1430 BP: 123/81 Pulse: 100 Resp: 12 Temp: 98.2 °F (36.8 °C) TempSrc: Oral  
SpO2: 97% Weight: 207 lb 3.2 oz (94 kg) Height: 5' 2.9\" (1.598 m) PainSc:   8 PainLoc: Throat Physical Exam  
Constitutional: She is oriented to person, place, and time and well-developed, well-nourished, and in no distress. HENT:  
Head: Normocephalic and atraumatic. Right Ear: External ear normal.  
Left Ear: External ear normal.  
Mouth/Throat: Oropharynx is clear and moist. No oropharyngeal exudate. Clear TMs. +Nasal congestion is present with erythematous turbinates. Sinus areas are NTTP Eyes: Conjunctivae and EOM are normal. Pupils are equal, round, and reactive to light. Right eye exhibits no discharge. Left eye exhibits no discharge. No scleral icterus. Neck: Neck supple. Cardiovascular: Normal rate, regular rhythm, normal heart sounds and intact distal pulses. Exam reveals no gallop and no friction rub. No murmur heard. Pulmonary/Chest: Effort normal and breath sounds normal. No respiratory distress. She has no wheezes. She has no rales. Abdominal: Soft. Bowel sounds are normal. She exhibits no distension. There is no tenderness. There is no rebound and no guarding. Musculoskeletal: She exhibits no edema or tenderness (BUE). Lymphadenopathy:  
  She has no cervical adenopathy. Neurological: She is alert and oriented to person, place, and time. She exhibits normal muscle tone. Gait normal.  
Skin: Skin is warm and dry. No erythema. Psychiatric: Affect normal.  
Nursing note and vitals reviewed. Assessment/Plan:  
Respiratory tract infection:  Likely viral etiology.  Afebrile. 
-Ordering promethazineDextromethorphan to be taken as needed for cough symptoms. I encouraged her to use this medication sparingly Rest. Hydration. Return to clinic if symptoms worsen. ORDERS 
- promethazine-dextromethorphan (PROMETHAZINE-DM) 6.25-15 mg/5 mL syrup; Take 5 mL by mouth every four (4) hours as needed for Cough. Indications: Cough  Dispense: 118 mL; Refill: 0 Health Maintenance Due Topic Date Due  Pneumococcal 19-64 Medium Risk (1 of 1 - PPSV23) 03/08/1986  
 DTaP/Tdap/Td series (1 - Tdap) 03/08/1988  PAP AKA CERVICAL CYTOLOGY  03/08/1988  Shingrix Vaccine Age 50> (1 of 2) 03/08/2017  BREAST CANCER SCRN MAMMOGRAM  03/08/2017  
 FOBT Q 1 YEAR AGE 50-75  03/08/2017  Influenza Age 5 to Adult  08/01/2018 Lab review: labs are reviewed in the EHR I have discussed the diagnosis with the patient and the intended plan as seen in the above orders. The patient has received an after-visit summary and questions were answered concerning future plans. I have discussed medication side effects and warnings with the patient as well. I have reviewed the plan of care with the patient, accepted their input and they are in agreement with the treatment goals. All questions were answered. The patient understands the plan of care. Handouts provided today with above information. Pt instructed if symptoms worsen to call the office or report to the ED for continued care. Greater than 50% of the visit time was spent in counseling and/or coordination of care. Voice recognition was used to generate this report, which may have resulted in some phonetic based errors in grammar and contents. Even though attempts were made to correct all the mistakes, some may have been missed, and remained in the body of the document. Follow-up Disposition: 
Return if symptoms worsen or fail to improve.  
 
Venita Sibley MD

## 2018-09-25 NOTE — PATIENT INSTRUCTIONS
Health Maintenance Due Topic Date Due  Pneumococcal 19-64 Medium Risk (1 of 1 - PPSV23) 03/08/1986  
 DTaP/Tdap/Td series (1 - Tdap) 03/08/1988  PAP AKA CERVICAL CYTOLOGY  03/08/1988  Shingrix Vaccine Age 50> (1 of 2) 03/08/2017  BREAST CANCER SCRN MAMMOGRAM  03/08/2017  
 FOBT Q 1 YEAR AGE 50-75  03/08/2017  Influenza Age 5 to Adult  08/01/2018

## 2018-09-25 NOTE — PROGRESS NOTES
Chief Complaint Patient presents with  Cough  
  x 5 days  and was exposed to someone in the family that had Pneumonia  Hoarse 1. Have you been to the ER, urgent care clinic since your last visit? Hospitalized since your last visit? No 
 
2. Have you seen or consulted any other health care providers outside of the [address] [place] [address] since your last visit? Include any pap smears or colon screening.  No

## 2018-09-26 LAB
MISCELLANEOUS TEST,99000: NORMAL
SENT TO, 434: NORMAL
TEST NAME, 435: NORMAL

## 2018-10-02 ENCOUNTER — TELEPHONE (OUTPATIENT)
Dept: INTERNAL MEDICINE CLINIC | Age: 53
End: 2018-10-02

## 2018-10-02 ENCOUNTER — OFFICE VISIT (OUTPATIENT)
Dept: INTERNAL MEDICINE CLINIC | Age: 53
End: 2018-10-02

## 2018-10-02 VITALS
BODY MASS INDEX: 37.17 KG/M2 | RESPIRATION RATE: 15 BRPM | HEART RATE: 83 BPM | HEIGHT: 63 IN | DIASTOLIC BLOOD PRESSURE: 74 MMHG | WEIGHT: 209.8 LBS | SYSTOLIC BLOOD PRESSURE: 112 MMHG | OXYGEN SATURATION: 96 % | TEMPERATURE: 98.1 F

## 2018-10-02 DIAGNOSIS — J20.8 ACUTE VIRAL BRONCHITIS: Primary | ICD-10-CM

## 2018-10-02 DIAGNOSIS — H66.002 ACUTE SUPPURATIVE OTITIS MEDIA OF LEFT EAR WITHOUT SPONTANEOUS RUPTURE OF TYMPANIC MEMBRANE, RECURRENCE NOT SPECIFIED: ICD-10-CM

## 2018-10-02 RX ORDER — AZITHROMYCIN 250 MG/1
TABLET, FILM COATED ORAL
Qty: 6 TAB | Refills: 0 | Status: SHIPPED | OUTPATIENT
Start: 2018-10-02 | End: 2018-10-07

## 2018-10-02 NOTE — PROGRESS NOTES
1. Have you been to the ER, urgent care clinic or hospitalized since your last visit? NO.     2. Have you seen or consulted any other health care providers outside of the [address] [place] [address] since your last visit (Include any pap smears or colon screening)? NO      Do you have an Advanced Directive? NO    Would you like information on Advanced Directives?  NO

## 2018-10-02 NOTE — MR AVS SNAPSHOT
[address] [facility] [address] 
 
 
 [address] [facility], [address] [facility] [address] 
[phone] Patient: Tauna Lennox MRN: [id number] :1965 Visit Information Date & Time Provider Department Dept. Phone Encounter #  
 10/2/2018  2:30 PM Terrance Santos NP [facility] [phone] Your Appointments 10/4/2018  3:20 PM  
New Patient with Coral Sanches [facility] [address] Cl Rasmussen ([address]) Appt Note: right hand pain/ ref by Margo Mcdowell/ xrays in [facility] the patient to come 30 minutes prior to their appointment with their picture I.D, Insurance cards and a list of their medications & dosage to the [facility] location [address] [facility], [address] [facility] [address]  
[phone] [address] [facility], [address]  
  
    
 10/19/2018  9:00 AM  
Office Visit with Alie Browning MD  
[facility] [address] [facility]) Appt Note: 4 week f/u  
 [address] [facility], [address] Deidra Cool [address] [place] [place]  
  
   
 [address], [address] Upcoming Health Maintenance Date Due Pneumococcal 19-64 Medium Risk (1 of 1 - PPSV23) 3/8/1986 DTaP/Tdap/Td series (1 - Tdap) 3/8/1988 PAP AKA CERVICAL CYTOLOGY 3/8/1988 Shingrix Vaccine Age 50> (1 of 2) 3/8/2017 BREAST CANCER SCRN MAMMOGRAM 3/8/2017 FOBT Q 1 YEAR AGE 50-75 3/8/2017 Influenza Age 5 to Adult 2018 Allergies as of 10/2/2018  Review Complete On: 10/2/2018 By: Delores Ponce Severity Noted Reaction Type Reactions Codeine High 2008    Nausea and Vomiting, Other (comments) Methylphenidate Hcl High 2011    Other (comments) Heart palpations Morphine High 2011    Nausea Only Other Medication High 2011    Nausea and Vomiting All opiods Penicillin G High 08/14/2008    Other (comments) Penicillins High 04/22/2011    Other (comments) Pt doesn't recall reaction. Was told she had a reaction when she was a child Current Immunizations  Never Reviewed No immunizations on file. Not reviewed this visit You Were Diagnosed With   
  
 Codes Comments Acute serous otitis media of left ear, recurrence not specified    -  Primary ICD-10-CM: H65.02 
ICD-9-CM: 381.01 Vitals BP Pulse Temp Resp Height(growth percentile) Weight(growth percentile) 112/74 (BP 1 Location: Left arm, BP Patient Position: Sitting) 83 98.1 °F (36.7 °C) (Oral) 15 5' 2.9\" (1.598 m) 209 lb 12.8 oz (95.2 kg) SpO2 BMI OB Status Smoking Status 96% 37.28 kg/m2 Hysterectomy Never Smoker Vitals History BMI and BSA Data Body Mass Index Body Surface Area  
 37.28 kg/m 2 2.06 m 2 Preferred Pharmacy Pharmacy Name Phone Antoine Juarez [address] [facility], [address] [place] [address] [phone] Your Updated Medication List  
  
   
This list is accurate as of 10/2/18  3:01 PM.  Always use your most recent med list.  
  
  
  
  
 albuterol 90 mcg/actuation inhaler Commonly known as:  PROVENTIL HFA, VENTOLIN HFA, PROAIR HFA Inhale 2 puffs into the lungs every 6 hours as needed. ibuprofen 200 mg Cap Take 800 mg by mouth three (3) times daily as needed. promethazine-dextromethorphan 6.25-15 mg/5 mL syrup Commonly known as:  PROMETHAZINE-DM Take 5 mL by mouth every four (4) hours as needed for Cough. Indications: Cough SYNTHROID 150 mcg tablet Generic drug:  levothyroxine Take 1 tablet Mon-Sat and 1.5 tabs on Sunday. Take 30 minutes to 1 hour before breakfast  
  
  
  
  
To-Do List   
 04/09/2019 11:00 AM  
  Appointment with CRISTAL MO at [address] [place] [address] ([phone]) PAYMENT  For Non-Medicare patients - $15.00 will be collected from you at the time of your exam.  You will be billed $35.00 from the reading [facility]. OUTSIDE FILMS  - Any outside films related to the study being scheduled should be brought with you on the day of the exam.  If this cannot be done there may be a delay in the reading of the study. MEDICATIONS  - Patient must bring a complete list of all medications currently taking to include prescriptions, over-the-counter meds, herbals, vitamins & any dietary supplements  GENERAL INSTRUCTIONS  - On the day of your exam do not use any bath powder, deodorant or lotions on the armpit area. -Tenderness of breasts may cause an increase of discomfort during procedure. If you are experiencing breast tenderness on the day of your appointment and would like to reschedule, please call [phone]. Introducing [facility] SERVICES! [employer] introduces Yoics patient portal. Now you can access parts of your medical record, email your doctor's office, and request medication refills online. 1. In your internet browser, go to [email or web address] 2. Click on the First Time User? Click Here link in the Sign In box. You will see the New Member Sign Up page. 3. Enter your Yoics Access Code exactly as it appears below. You will not need to use this code after youve completed the sign-up process. If you do not sign up before the expiration date, you must request a new code. · Yoics Access Code: [email or web address] Expires: 12/17/2018  1:38 PM 
 
4. Enter the last four digits of your Social Security Number (xxxx) and Date of Birth (mm/dd/yyyy) as indicated and click Submit. You will be taken to the next sign-up page. 5. Create a [employer] ID. This will be your Yoics login ID and cannot be changed, so think of one that is secure and easy to remember. 6. Create a Yoics password. You can change your password at any time. 7. Enter your Password Reset Question and Answer.  This can be used at a later time if you forget your password. 8. Enter your e-mail address. You will receive e-mail notification when new information is available in [address] 9. Click Sign Up. You can now view and download portions of your medical record. 10. Click the Download Summary menu link to download a portable copy of your medical information. If you have questions, please visit the Frequently Asked Questions section of the [employer] website. Remember, [employer] is NOT to be used for urgent needs. For medical emergencies, dial 911. Now available from your iPhone and Android! Please provide this summary of care documentation to your next provider. Your primary care clinician is listed as Anna Barrios. If you have any questions after today's visit, please call [phone].

## 2018-10-02 NOTE — TELEPHONE ENCOUNTER
[address] [facility] is calling about rx Lalita Slider, they no longer carry it please advise

## 2018-10-02 NOTE — PROGRESS NOTES
Kj Sarmiento is a 48 y.o.  female and presents with    Chief Complaint   Patient presents with    Cough     Patient here for dry cough & head congestion. Patient reports using her inhaler and now ribs and chest hurt from the coughing. x 10 days        Subjective:  HPI   Mrs. Elidia Schultz presents today with complaints of dry cough intermittently, cough worse during the night, nonproductive, using the cough syrup with some relief, reports shortness of breath relieved with Albuterol , when she breathes in gets a tickle in the throat, and with sinus congestion and bilateral ear fullness. She is using her inhaler, albuterol, \"throughout the day, way more than I have ever used\". Using Ibuprofen for headaches due to coughing and chest pain due to coughing. She presented x 1 week ago with her PCP Dr. Kellie Stanton with similar complaints. She was diagnosed with URI and given Promethazine cough syrup. Additional Concerns: none     ROS   Review of Systems   Constitutional: Positive for chills and malaise/fatigue. Negative for diaphoresis, fever and weight loss. HENT: Positive for congestion. Negative for ear discharge, ear pain, hearing loss, sinus pain, sore throat and tinnitus. Respiratory: Positive for cough and shortness of breath. Negative for hemoptysis, sputum production and wheezing. Cardiovascular: Negative. Gastrointestinal: Positive for nausea. Negative for abdominal pain, blood in stool, constipation, diarrhea, heartburn, melena and vomiting. Genitourinary: Negative. Skin: Negative. Neurological: Positive for headaches. Negative for dizziness and weakness.        Allergies   Allergen Reactions    Codeine Nausea and Vomiting and Other (comments)    Methylphenidate Hcl Other (comments)     Heart palpations    Morphine Nausea Only    Other Medication Nausea and Vomiting     All opiods    Penicillin G Other (comments)    Penicillins Other (comments)     Pt doesn't recall reaction. Was told she had a reaction when she was a child       Current Outpatient Prescriptions   Medication Sig Dispense Refill    azithromycin (ZITHROMAX) 250 mg tablet Take 2 tablets today, then take 1 tablet daily 6 Tab 0    beclomethasone (QVAR) 40 mcg/actuation aero Take 1 Puff by inhalation two (2) times a day. 1 Inhaler 1    ibuprofen 200 mg cap Take 800 mg by mouth three (3) times daily as needed.  promethazine-dextromethorphan (PROMETHAZINE-DM) 6.25-15 mg/5 mL syrup Take 5 mL by mouth every four (4) hours as needed for Cough. Indications: Cough 118 mL 0    albuterol (PROVENTIL HFA, VENTOLIN HFA, PROAIR HFA) 90 mcg/actuation inhaler Inhale 2 puffs into the lungs every 6 hours as needed.  levothyroxine (SYNTHROID) 150 mcg tablet Take 1 tablet Mon-Sat and 1.5 tabs on Sunday. Take 30 minutes to 1 hour before breakfast         Social History     Social History    Marital status:      Spouse name: N/A    Number of children: N/A    Years of education: N/A     Occupational History    Not on file.      Social History Main Topics    Smoking status: Never Smoker    Smokeless tobacco: Never Used    Alcohol use 0.0 - 0.6 oz/week     0 - 1 Glasses of wine per week    Drug use: Not on file    Sexual activity: Yes     Partners: Male     Birth control/ protection: None     Other Topics Concern    Not on file     Social History Narrative       Past Medical History:   Diagnosis Date    Abnormal EKG     Abnormal Pap smear of cervix     Anemia     Asthma     Depression     Elevated BP without diagnosis of hypertension     Headache     Hypercholesterolemia     Thyroid disease        Past Surgical History:   Procedure Laterality Date    HX APPENDECTOMY      HX ADAMA AND BSO      HX THYROIDECTOMY      HX WISDOM TEETH EXTRACTION      x4       Family History   Problem Relation Age of Onset    Parkinson's Disease Mother     Dementia Mother     Thyroid Cancer Mother     Cataract Mother  Hypertension Father     Diabetes Father     Arthritis-osteo Father     Atrial Fibrillation Brother     Hypertension Brother     Macular Degen Maternal Grandmother     Thyroid Cancer Maternal Grandmother     Cataract Maternal Grandmother     No Known Problems Maternal Grandfather     Arthritis-rheumatoid Paternal Grandmother     Diabetes Paternal Grandfather     Attention Deficit Hyperactivity Disorder Son     Anxiety Daughter     Thyroid Disease Daughter        Objective:  Vitals:    10/02/18 1422   BP: 112/74   Pulse: 83   Resp: 15   Temp: 98.1 °F (36.7 °C)   TempSrc: Oral   SpO2: 96%   Weight: 209 lb 12.8 oz (95.2 kg)   Height: 5' 2.9\" (1.598 m)   PainSc:   4   PainLoc: Chest       LABS   Results for orders placed or performed during the hospital encounter of 09/18/18   [facility]. Result Value Ref Range    [facility]         TESTS  none    PE  Physical Exam   Constitutional: She is oriented to person, place, and time. She appears well-developed and well-nourished. No distress. Looks tired   HENT:   Head: Normocephalic and atraumatic. Right Ear: External ear normal.   Nose: Nose normal.   Mouth/Throat: Oropharynx is clear and moist. No oropharyngeal exudate. Purulent discharge behind TM and erythema and swelling noted as structures less prominent   Eyes: EOM are normal. Pupils are equal, round, and reactive to light. Right eye exhibits no discharge. Left eye exhibits no discharge. Injected conjunctiva   Neck: Normal range of motion. Cardiovascular: Normal rate, regular rhythm, normal heart sounds and intact distal pulses. Pulmonary/Chest: Effort normal and breath sounds normal. No respiratory distress. She has no wheezes. She has no rales. She exhibits tenderness. Abdominal: Soft. Bowel sounds are normal. She exhibits no distension. There is no tenderness. Lymphadenopathy:     She has cervical adenopathy.    Neurological: She is alert and oriented to person, place, and time. Skin: Skin is warm and dry. She is not diaphoretic. Psychiatric: She has a normal mood and affect. Her behavior is normal. Judgment and thought content normal.   Vitals reviewed. Assessment/Plan:    1. Left otitis media/viral bronchitis- possibly triggering an asthma exacerbation- Zpack ordered, allergy to PCN-uriticaria. Qvar ordered, she is with hx of asthma with excessive use of Albuterol use currently, could also be with postnasal drip, denies productive cough however with cough that is worse at night and shortness of breath, denies wheezing, afebrile but with complaints of chills. Return if symptoms worsen. Lab review: no lab studies available for review at time of visit    Today's Visit:   Diagnoses and all orders for this visit:    1. Acute viral bronchitis  -     beclomethasone (QVAR) 40 mcg/actuation aero; Take 1 Puff by inhalation two (2) times a day. 2. Acute suppurative otitis media of left ear without spontaneous rupture of tympanic membrane, recurrence not specified  -     azithromycin (ZITHROMAX) 250 mg tablet; Take 2 tablets today, then take 1 tablet daily      Health Maintenance: Deferred to PcP. I have discussed the diagnosis with the patient and the intended plan as seen in the above orders. The patient has received an after-visit summary and questions were answered concerning future plans. I have discussed medication side effects and warnings with the patient as well. I have reviewed the plan of care with the patient, accepted their input and they are in agreement with the treatment goals. Follow-up Disposition: Not on File   More than 1/2 of this 15 minute visit was spent in counseling and coordination of care, as described above.     MARCO Carrion  Internist of [address], [facility].  Phone: [phone]  Fax: [phone]

## 2018-10-03 ENCOUNTER — TELEPHONE (OUTPATIENT)
Dept: INTERNAL MEDICINE CLINIC | Age: 53
End: 2018-10-03

## 2018-10-03 RX ORDER — PREDNISONE 20 MG/1
TABLET ORAL
Qty: 6 TAB | Refills: 0 | Status: SHIPPED | OUTPATIENT
Start: 2018-10-03 | End: 2018-10-03 | Stop reason: SDUPTHER

## 2018-10-03 RX ORDER — PREDNISONE 20 MG/1
TABLET ORAL
Qty: 6 TAB | Refills: 0 | Status: SHIPPED | OUTPATIENT
Start: 2018-10-03 | End: 2019-04-30 | Stop reason: ALTCHOICE

## 2018-10-03 NOTE — TELEPHONE ENCOUNTER
Insurance won't cover Pulmicort flexhaler- is there a pill you can call in?   Call into [facility] in [place]

## 2018-10-03 NOTE — TELEPHONE ENCOUNTER
Prescribed short course of prednisone due to cost of inhalers. Qvar not in stock, Pulmicort reported as too costly.

## 2018-10-04 ENCOUNTER — OFFICE VISIT (OUTPATIENT)
Dept: ORTHOPEDIC SURGERY | Age: 53
End: 2018-10-04

## 2018-10-04 VITALS
TEMPERATURE: 96.9 F | OXYGEN SATURATION: 98 % | DIASTOLIC BLOOD PRESSURE: 95 MMHG | RESPIRATION RATE: 16 BRPM | HEIGHT: 63 IN | SYSTOLIC BLOOD PRESSURE: 152 MMHG | HEART RATE: 74 BPM | WEIGHT: 207.4 LBS | BODY MASS INDEX: 36.75 KG/M2

## 2018-10-04 DIAGNOSIS — G56.03 CARPAL TUNNEL SYNDROME, BILATERAL: ICD-10-CM

## 2018-10-04 DIAGNOSIS — M65.9 TENOSYNOVITIS OF RIGHT HAND: Primary | ICD-10-CM

## 2018-10-04 DIAGNOSIS — M86.241 SUBACUTE OSTEOMYELITIS OF RIGHT HAND (HCC): ICD-10-CM

## 2018-10-04 NOTE — PATIENT INSTRUCTIONS
Tenosynovitis of the Wrist: Care Instructions Your Care Instructions Tenosynovitis (say \"ten-oh-sin-uh-VY-tus\") means the lining of a tendon is inflamed. This problem usually affects tendons in your thumb and wrist. A tendon is a cord that joins muscle to bone. Tenosynovitis can be caused by an injury. Or it may be caused by repeating a movement over and over, such as when you knit, lift things, or play video games. In rare cases, an infected wound causes it. In most cases, you can recover fully. But if the problem is caused by doing something over and over and you don't stop or change doing that, it may come back. Follow-up care is a key part of your treatment and safety. Be sure to make and go to all appointments, and call your doctor if you are having problems. It's also a good idea to know your test results and keep a list of the medicines you take. How can you care for yourself at home? · Prop up the sore wrist on a pillow when you ice it or anytime you sit or lie down during the next 3 days. Try to keep it above the level of your heart. This will help reduce swelling. · Put ice or cold packs on your wrist for 10 to 20 minutes at a time. Try to do this every 1 to 2 hours for the next 3 days (when you are awake) or until the swelling goes down. Put a thin cloth between the ice pack and your skin. · If your swelling is gone after 2 or 3 days, put a heating pad set on low or a warm cloth on your wrist for 15 to 20 minutes. This can reduce pain. · If your doctor gave you an elastic bandage, keep it on for the next 24 to 36 hours or for as long as your doctor told you. The bandage should be snug. But it should not be tight enough to cause numbness, tingling, or swelling. · If your doctor gave you a splint or brace, wear it as directed. It will protect your wrist until it is better. · Take pain medicines exactly as directed.  
¨ If the doctor gave you a prescription medicine for pain, take it as prescribed. ¨ If you are not taking a prescription pain medicine, ask your doctor if you can take an over-the-counter medicine. · If your doctor prescribes antibiotics, take them as directed. Do not stop taking them just because you feel better. You need to take the full course of antibiotics. · Try not to use your injured wrist and hand. · After you are better, do exercises to make the muscles around your tendon stronger. This can prevent the problem from coming back. Follow instructions from your doctor. When should you call for help? Call your doctor now or seek immediate medical care if: 
  · Your hand or fingers are cool or pale or change colors.  
  · You have tingling, weakness, or numbness in your hand and fingers.  
  · Your pain gets worse.  
  · The tendon may be infected. Signs of infection include: 
¨ Increased pain and tenderness around the wrist or thumb. ¨ Swelling or redness of the wrist or thumb. ¨ A fever.  
 Watch closely for changes in your health, and be sure to contact your doctor if: 
  · You do not get better as expected. Where can you learn more? Go to http://kaylyn-fara.info/. Enter W037 in the search box to learn more about \"Tenosynovitis of the Wrist: Care Instructions. \" Current as of: November 29, 2017 Content Version: 11.8 © 7955-1021 [employer]. Care instructions adapted under license by [employer] (which disclaims liability or warranty for this information). If you have questions about a medical condition or this instruction, always ask your healthcare professional. [place] [address] any warranty or liability for your use of this information.

## 2018-10-04 NOTE — PROGRESS NOTES
Renetta Neri is a 48 y.o. female right handed [facility] . Worker's Compensation and legal considerations: none filed. Vitals:  
 10/04/18 1418 BP: (!) 152/95 Pulse: 74 Resp: 16 Temp: 96.9 °F (36.1 °C) TempSrc: Oral  
SpO2: 98% Weight: 207 lb 6.4 oz (94.1 kg) Height: 5' 2.9\" (1.598 m) PainSc:   0 - No pain Chief Complaint Patient presents with  
 Hand Pain RIGHT HAND PAIN   
 
 
 
HPI: R Hand pain Date of onset:  2 months Injury: Yes: Comment: Catbite in march 2018 Prior Treatment:  Yes: Comment: 2 rounds of PO Abx in March that resolved the pain until 2 months ago. Dr Rica Sethi also ran a rheumatoid profile that was negative Numbness/ Tingling: Yes: Comment: Occasionally at night or when holding the steering  wheel ROS: Review of Systems - General ROS: negative Respiratory ROS: no cough, shortness of breath, or wheezing Cardiovascular ROS: no chest pain or dyspnea on exertion Musculoskeletal ROS: positive for - pain in hand - right Neurological ROS: positive for - numbness/tingling Past Medical History:  
Diagnosis Date  Abnormal EKG  Abnormal Pap smear of cervix  Anemia  Asthma  Depression  Elevated BP without diagnosis of hypertension  Headache  Hypercholesterolemia  Thyroid disease Past Surgical History:  
Procedure Laterality Date  HX APPENDECTOMY  HX ADAMA AND BSO  HX THYROIDECTOMY  HX WISDOM TEETH EXTRACTION    
 x4 Current Outpatient Prescriptions Medication Sig Dispense Refill  predniSONE (DELTASONE) 20 mg tablet Take 2 tablets by mouth x 3 days. 6 Tab 0  
 azithromycin (ZITHROMAX) 250 mg tablet Take 2 tablets today, then take 1 tablet daily 6 Tab 0  
 budesonide (PULMICORT FLEXHALER) 90 mcg/actuation aepb inhaler Take 1 Puff by inhalation two (2) times a day. 1 Inhaler 0  
 ibuprofen 200 mg cap Take 800 mg by mouth three (3) times daily as needed.  promethazine-dextromethorphan (PROMETHAZINE-DM) 6.25-15 mg/5 mL syrup Take 5 mL by mouth every four (4) hours as needed for Cough. Indications: Cough 118 mL 0  
 albuterol (PROVENTIL HFA, VENTOLIN HFA, PROAIR HFA) 90 mcg/actuation inhaler Inhale 2 puffs into the lungs every 6 hours as needed.  levothyroxine (SYNTHROID) 150 mcg tablet Take 1 tablet Mon-Sat and 1.5 tabs on Sunday. Take 30 minutes to 1 hour before breakfast    
 
 
Allergies Allergen Reactions  Codeine Nausea and Vomiting and Other (comments)  Methylphenidate Hcl Other (comments) Heart palpations  Morphine Nausea Only  Other Medication Nausea and Vomiting All opiods  Penicillin G Other (comments)  Penicillins Other (comments) Pt doesn't recall reaction. Was told she had a reaction when she was a child PE: R Hand:  Tenderness and edema over the 2nd and 3rd MCPs dorsally corresponding to the extensor tendons. There is also pain with resisted active extension of the digits in the same area. She has FROM and is NVI. There is no warmth or erythema or other signs of acute infection NEUROVASCULAR:  Only partially positive provocative signs of median neuropathy on both sides Examination L R Examination L R Carpal Comp. + + Pronator Comp. - -  
Carpal Tinel - - Pronator Tinel - - Phalen's - - Pronator Stress - -  
Cubital Comp. - - Guyon Comp. - -  
Cubital Tinel - - Guyon Tinel - -  
Elbow Hyperflexion - - Adson's - - Spurling's - - SC Comp. - -  
PCB Median abn - - SC Tinel - - Radial Tinel - - IC Comp. - - Digital Tinel - - IC Tinel - - Radial 2-Pt WNL WNL Ulnar 2-Pt WNL WNL Radial Pulse: 2+ Capillary Refill: < 2 sec Arnel: Not Performed Digital Hodgeman: Not Performed Imaging: Plain films from 9/2018 are reviewed that show no acute osseous injuries or signs of chronic osteomyelitis. There are minimal degenerative changes noted in the hand. ICD-10-CM ICD-9-CM 1. Tenosynovitis of right hand M65.9 727.05 MRI HAND RT WO CONT 2. Subacute osteomyelitis of right hand (HCC) M86.241 730.04 MRI HAND RT WO CONT 3. Carpal tunnel syndrome, bilateral G56.03 354.0 Plan: MRI of R Hand to r/o Osteomyelitis or tenosynovitis I expect tenosynovitis, however given her relatively recent history of a catbite, I cannot rule out the possibility of a subacute presentation of osteomyelitis F/U after EMG Will Consider OT and possible injection then if only tenosynovitis Plan was reviewed with patient, who verbalized agreement and understanding of the plan

## 2018-10-17 ENCOUNTER — HOSPITAL ENCOUNTER (OUTPATIENT)
Age: 53
Discharge: HOME | End: 2018-10-17
Attending: ORTHOPAEDIC SURGERY
Payer: COMMERCIAL

## 2018-10-17 DIAGNOSIS — M65.9 TENOSYNOVITIS OF RIGHT HAND: ICD-10-CM

## 2018-10-17 DIAGNOSIS — M86.241 SUBACUTE OSTEOMYELITIS OF RIGHT HAND (HCC): ICD-10-CM

## 2018-10-17 PROCEDURE — 73218 MRI UPPER EXTREMITY W/O DYE: CPT

## 2018-10-18 DIAGNOSIS — Z01.818 PRE-OP TESTING: Primary | ICD-10-CM

## 2018-10-25 ENCOUNTER — OFFICE VISIT (OUTPATIENT)
Dept: ORTHOPEDIC SURGERY | Age: 53
End: 2018-10-25

## 2018-10-25 VITALS
BODY MASS INDEX: 36.68 KG/M2 | WEIGHT: 207 LBS | RESPIRATION RATE: 16 BRPM | HEART RATE: 83 BPM | OXYGEN SATURATION: 98 % | TEMPERATURE: 96.8 F | DIASTOLIC BLOOD PRESSURE: 87 MMHG | HEIGHT: 63 IN | SYSTOLIC BLOOD PRESSURE: 134 MMHG

## 2018-10-25 DIAGNOSIS — M25.641 JOINT STIFFNESS OF HAND, RIGHT: Primary | ICD-10-CM

## 2018-10-25 NOTE — PROGRESS NOTES
Antonina Mauricio is a 48 y.o. female right handed [facility] . Worker's Compensation and legal considerations: none filed Vitals:  
 10/25/18 7281 BP: 134/87 Pulse: 83 Resp: 16 Temp: 96.8 °F (36 °C) TempSrc: Oral  
SpO2: 98% Weight: 207 lb (93.9 kg) Height: 5' 2.9\" (1.598 m) PainSc:   0 - No pain Chief Complaint Patient presents with  
 Hand Pain RIGHT HAND F/U APPT. HPI: Go) him out of future if he can he [place] [employer] patient returns today after her MRI. She continues to localize her pain to being in the webspaces between the index and middle and middle and ring fingers. She denies any pain in the proximal hand over the wrist.  She reports most of her pain is when she is trying to stretch her fingers apart. Again she has a remote history several months ago of having a Bite at those fingers. She had been doing fine but just developed pain more recently. Additionally she is got some swelling over the dorsum of the hand. She has had a workup for rheumatoid arthritis that came back negative. Date of onset: 3 months Injury: Yes: Comment: I several months ago that was doing fine. No new injury. Prior Treatment:  No 
 
Numbness/ Tingling: No 
 
ROS: Review of Systems - Negative except HPI Past Medical History:  
Diagnosis Date  Abnormal EKG  Abnormal Pap smear of cervix  Anemia  Asthma  Depression  Elevated BP without diagnosis of hypertension  Headache  Hypercholesterolemia  Thyroid disease Past Surgical History:  
Procedure Laterality Date  HX APPENDECTOMY  HX ADAMA AND BSO  HX THYROIDECTOMY  HX WISDOM TEETH EXTRACTION    
 x4 Current Outpatient Medications Medication Sig Dispense Refill  predniSONE (DELTASONE) 20 mg tablet Take 2 tablets by mouth x 3 days.  6 Tab 0  
 budesonide (PULMICORT FLEXHALER) 90 mcg/actuation aepb inhaler Take 1 Puff by inhalation two (2) times a day. 1 Inhaler 0  
 ibuprofen 200 mg cap Take 800 mg by mouth three (3) times daily as needed.  promethazine-dextromethorphan (PROMETHAZINE-DM) 6.25-15 mg/5 mL syrup Take 5 mL by mouth every four (4) hours as needed for Cough. Indications: Cough 118 mL 0  
 albuterol (PROVENTIL HFA, VENTOLIN HFA, PROAIR HFA) 90 mcg/actuation inhaler Inhale 2 puffs into the lungs every 6 hours as needed.  levothyroxine (SYNTHROID) 150 mcg tablet Take 1 tablet Mon-Sat and 1.5 tabs on Sunday. Take 30 minutes to 1 hour before breakfast    
 
 
Allergies Allergen Reactions  Codeine Nausea and Vomiting and Other (comments)  Methylphenidate Hcl Other (comments) Heart palpations  Morphine Nausea Only  Other Medication Nausea and Vomiting All opiods  Penicillin G Other (comments)  Penicillins Other (comments) Pt doesn't recall reaction. Was told she had a reaction when she was a child PE: R Hand:  Tenderness and edema over the 2nd and 3rd MCPs dorsally corresponding to the extensor tendons. There is also pain with resisted active extension of the digits in the same area, however this is significantly improved compared to her last visit. She has FROM and is NVI. There is no warmth or erythema or other signs of acute infection Imaging: MRI: 
 
IMPRESSION: 
  
Multijoint cystic change. Suspect early degenerative arthritis. There is one 
equivocal erosion noted. Follow-up and/or correlation with serum markers can be 
obtained. 
  
8 x 13 x 8 mm ganglion cyst arising adjacent to the distal scaphoid. ICD-10-CM ICD-9-CM 1. Joint stiffness of hand, right M25.641 719.54 Plan: At this point there is no evidence of osteomyelitis in the area of question. It looks like she is just got some edema or tenosynovitis around her extensors.  
 
Given the fact that there are no correlating signs of osteomyelitis in the area of her pain, I will cancel the original ordered labs and have the patient start moving her fingers and working on exercises. I will see her back in 3 months or on an as-needed basis. She can see me sooner if the pain worsens. At her next visit we can consider a steroid injection if the edema does not go away. Plan was reviewed with patient, who verbalized agreement and understanding of the plan

## 2019-04-30 ENCOUNTER — OFFICE VISIT (OUTPATIENT)
Dept: INTERNAL MEDICINE CLINIC | Age: 54
End: 2019-04-30

## 2019-04-30 VITALS
TEMPERATURE: 95.7 F | DIASTOLIC BLOOD PRESSURE: 85 MMHG | HEART RATE: 80 BPM | WEIGHT: 207 LBS | SYSTOLIC BLOOD PRESSURE: 138 MMHG | OXYGEN SATURATION: 99 % | HEIGHT: 63 IN | BODY MASS INDEX: 36.68 KG/M2 | RESPIRATION RATE: 14 BRPM

## 2019-04-30 DIAGNOSIS — M25.561 ACUTE PAIN OF RIGHT KNEE: Primary | ICD-10-CM

## 2019-04-30 DIAGNOSIS — E66.9 CLASS 2 OBESITY WITHOUT SERIOUS COMORBIDITY WITH BODY MASS INDEX (BMI) OF 36.0 TO 36.9 IN ADULT, UNSPECIFIED OBESITY TYPE: ICD-10-CM

## 2019-04-30 DIAGNOSIS — M54.5 ACUTE MIDLINE LOW BACK PAIN, WITH SCIATICA PRESENCE UNSPECIFIED: ICD-10-CM

## 2019-04-30 DIAGNOSIS — M25.551 RIGHT HIP PAIN: ICD-10-CM

## 2019-04-30 DIAGNOSIS — E78.2 MIXED HYPERLIPIDEMIA: ICD-10-CM

## 2019-04-30 DIAGNOSIS — E89.0 POSTOPERATIVE HYPOTHYROIDISM: ICD-10-CM

## 2019-04-30 RX ORDER — CYCLOBENZAPRINE HCL 5 MG
5 TABLET ORAL
Qty: 30 TAB | Refills: 3 | Status: SHIPPED | OUTPATIENT
Start: 2019-04-30 | End: 2019-07-24 | Stop reason: ALTCHOICE

## 2019-04-30 RX ORDER — LEVOTHYROXINE SODIUM 150 UG/1
150-300 TABLET ORAL
COMMUNITY
Start: 2018-10-23 | End: 2019-07-24 | Stop reason: SDUPTHER

## 2019-04-30 RX ORDER — ESTRADIOL 0.05 MG/D
1 FILM, EXTENDED RELEASE TRANSDERMAL
Refills: 0 | COMMUNITY
Start: 2019-04-23

## 2019-04-30 NOTE — PROGRESS NOTES
Chief Complaint Patient presents with  Knee Pain Right Knee pain after having a fall in a facility onto the hard floor, the pain has gotten worse since the fall a martha ago   ROOM 2  
 
 
1. Have you been to the ER, urgent care clinic since your last visit? Hospitalized since your last visit? No 
 
2. Have you seen or consulted any other health care providers outside of the [address] [place] [address] since your last visit? Include any pap smears or colon screening. No 
 
Patient was given a copy of the Advanced Directive and understands to bring it in once completed. Health Maintenance Due Topic Date Due  Pneumococcal 0-64 years (1 of 1 - PPSV23) 03/08/1973  PAP AKA CERVICAL CYTOLOGY  03/08/1988  Shingrix Vaccine Age 50> (1 of 2) 03/08/2017  BREAST CANCER SCRN MAMMOGRAM  03/08/2017  
 FOBT Q 1 YEAR AGE 50-75  03/08/2017

## 2019-04-30 NOTE — PROGRESS NOTES
INTERNISTS OF [facility]: 
4/30/2019, MRN: [id number] Eunice Spears is a 48 y.o. female and presents to clinic for Knee Pain (Right Knee pain after having a fall in a facility onto the hard floor, the pain has gotten worse since the fall a martha ago   ROOM 2) Subjective: The pt is a 47yo female with h/o HLD, asthma, and hypothyroidism (postoperative s/p thyroidectomy for multinodular goiter hx; followed by [facility] Endocrinology team). 1.  Hyperlipidemia: The patient has a history of hyperlipidemia yet we do not have a recent lipid panel that has been checked on her behalf. Her weight today is 207lbs. She is not regularly exercising 2/2 #3. 
 
2.  Hypothyroidism: Status post thyroidectomy given history of multinodular goiter. She is followed by an Endocrinology team. +On Synthroid. 3.  Right Knee Pain: Present x 2 months. Sx began after a fall (she stumbled). She had significant relief of pain sx with cold compresses and NSAIDs OTC. After that, she had a reoccurence of pain sx after sitting for hours with a Mormon friend she sat with at the hospital. Pain is worsening since that time. Her right knee does not give out on her. No alleviating factors are known. Pain restricts her activity level. She has h/o left knee surgery, not right knee surgery. 4.  Health maintenance:  
Overdue for a colonoscopy. She has this screening scheduled though 5. Lower Back Pain: The patient reports lower back pain that is midline in location for >4 wks. Pain does not radiate. No relieving factors are known of and NSAIDs. No relief with Tylenol. Patient Active Problem List  
 Diagnosis Date Noted  Right hand pain 09/19/2018  Severe obesity (BMI 35.0-39.9) 09/19/2018  Hyperlipidemia 09/18/2018  Postoperative hypothyroidism 09/18/2018  Mild intermittent asthma without complication 59/15/9904 Current Outpatient Medications Medication Sig Dispense Refill  estradiol (VIVELLE) 0.05 mg/24 hr 1 Patch by TransDERmal route Every Saturday. 0  
 levothyroxine (SYNTHROID) 150 mcg tablet TAKE 1 TABLET BY MOUTH MONDAY THROUGH SATURDAY AND 3 TABLETS ON SUNDAY, 30 MINUTES TO 1 HOUR BEFORE BREAKFAST  cyclobenzaprine (FLEXERIL) 5 mg tablet Take 1 Tab by mouth three (3) times daily as needed for Muscle Spasm(s). 30 Tab 3  
 budesonide (PULMICORT FLEXHALER) 90 mcg/actuation aepb inhaler Take 1 Puff by inhalation two (2) times a day. 1 Inhaler 0  
 ibuprofen 200 mg cap Take 800 mg by mouth three (3) times daily as needed.  albuterol (PROVENTIL HFA, VENTOLIN HFA, PROAIR HFA) 90 mcg/actuation inhaler Inhale 2 puffs into the lungs every 6 hours as needed.  levothyroxine (SYNTHROID) 150 mcg tablet Take 1 tablet Mon-Sat and 1.5 tabs on Sunday. Take 30 minutes to 1 hour before breakfast    
 
 
Allergies Allergen Reactions  Codeine Nausea and Vomiting and Other (comments)  Hydrocodone Other (comments)  Hydromorphone Other (comments)  Methylphenidate Hcl Other (comments) Heart palpations Heart palpations  Morphine Nausea Only and Other (comments)  Other Medication Nausea and Vomiting All opiods  Oxycodone Other (comments)  Penicillin G Other (comments)  Penicillins Other (comments) Pt doesn't recall reaction. Was told she had a reaction when she was a child Pt doesn't recall reaction. Was told she had a reaction when she was a child RASH  Tramadol Other (comments) Past Medical History:  
Diagnosis Date  Abnormal EKG  Abnormal Pap smear of cervix  Anemia  Asthma  Depression  Elevated BP without diagnosis of hypertension  Headache  Hypercholesterolemia  Thyroid disease Past Surgical History:  
Procedure Laterality Date  HX APPENDECTOMY  HX ADAMA AND BSO  HX THYROIDECTOMY  HX WISDOM TEETH EXTRACTION    
 x4 Family History Problem Relation Age of Onset  Parkinson's Disease Mother  Dementia Mother  Thyroid Cancer Mother [address] [facility] Cataract Mother  Hypertension Father  Diabetes Father  Arthritis-osteo Father  Atrial Fibrillation Brother  Hypertension Brother  Macular Degen Maternal Grandmother  Thyroid Cancer Maternal Grandmother  Cataract Maternal Grandmother  No Known Problems Maternal Grandfather  Arthritis-rheumatoid Paternal Grandmother  Diabetes Paternal Grandfather  Attention Deficit Hyperactivity Disorder Son  Anxiety Daughter  Thyroid Disease Daughter Social History Tobacco Use  Smoking status: Never Smoker  Smokeless tobacco: Never Used Substance Use Topics  Alcohol use: Yes Alcohol/week: 0.0 - 0.6 oz  
 
 
ROS Review of Systems Constitutional: Negative for fever. HENT: Negative for ear pain and sore throat. Eyes: Negative for pain. Respiratory: Negative for shortness of breath. Cardiovascular: Negative for chest pain. Gastrointestinal: Negative for abdominal pain. Genitourinary: Negative for dysuria. Musculoskeletal: Positive for joint pain (see HPI). Negative for myalgias. Neurological: Negative for tingling and headaches. Endo/Heme/Allergies: Negative for environmental allergies. Psychiatric/Behavioral: Negative for substance abuse. Objective Vitals:  
 04/30/19 8404 BP: 138/85 Pulse: 80 Resp: 14 Temp: 95.7 °F (35.4 °C) TempSrc: Oral  
SpO2: 99% Weight: 207 lb (93.9 kg) Height: 5' 2.9\" (1.598 m) PainSc:   9 PainLoc: Knee Physical Exam  
Constitutional: She is oriented to person, place, and time and well-developed, well-nourished, and in no distress. HENT:  
Head: Normocephalic and atraumatic. Right Ear: External ear normal.  
Left Ear: External ear normal.  
Nose: Nose normal.  
Mouth/Throat: Oropharynx is clear and moist. No oropharyngeal exudate. Eyes: Pupils are equal, round, and reactive to light. Conjunctivae and EOM are normal. Right eye exhibits no discharge. Left eye exhibits no discharge. No scleral icterus. Neck: Neck supple. Cardiovascular: Normal rate, regular rhythm, normal heart sounds and intact distal pulses. Exam reveals no gallop and no friction rub. No murmur heard. Pulmonary/Chest: Effort normal and breath sounds normal. No respiratory distress. She has no wheezes. She has no rales. Abdominal: Soft. Bowel sounds are normal. She exhibits no distension. There is no tenderness. There is no rebound and no guarding. Musculoskeletal: She exhibits no edema or tenderness (BUE). She has pain with palpation of the right popliteal fossa. She has pain with palpation of the anteromedial aspect of her right knee as well. No effusion is present. She has pain with range of motion exercises along her right hip as well. She has lumbar paraspinal muscles are tender palpation. Her lumbar spinous processes are mildly tender to palpation. She has crepitus with range of motion exercises along her right knee. She has limited range of motion along her right knee secondary to pain. Lymphadenopathy:  
  She has no cervical adenopathy. Neurological: She is alert and oriented to person, place, and time. She exhibits normal muscle tone. Gait normal.  
Skin: Skin is warm and dry. No erythema. Psychiatric: Affect normal.  
Nursing note and vitals reviewed. Assessment/Plan: 1. Hyperlipidemia history: 
Check a lipid panel. I will also screen her for diabetes given her weight today. I encouraged her to reduce her caloric intake as a means of reducing her weight given her complaint of knee pain today and restricted activity level as a result. I will recheck her weight at her follow-up appointment. 2.  Health Maintenance: To be addressed when she returns. Return to clinic for a checkup and to review lab results. I we will follow-up on her breast cancer screening and cervical cancer screening at her follow-up appointment if time permits.  She needs colon cancer screening, which was discussed with her today. I encouraged her to get her scheduled colonoscopy. 3.  Hypothyroidism: Stable. Continue with Synthroid. Continue to follow-up with the Endocrinology team. 
Viridiana June a CBC and TFTs. 3.  Right Knee Pain: From OA and Baker's Cyst? 
 We discussed referral to Orthopedics today. Placing a referral to Orthopedics Activity as tolerated.  Okay to use Tylenol and NSAIDs sparingly.  Checking a uric acid level, rheumatoid studies, and a CRP for completeness. 
- Popliteal fossa ultrasound ordered to r/o Baker's cyst 
- Right knee xray series ordered. 4.  Lower back pain: +Muscle spasm per PE findings. OA? Ordering a lumbar x-ray series. Activity as tolerated. Okay to use Tylenol and NSAIDs sparingly. [facility] muscle strain/spasm along lumbar spine 5. Right Hip Pain: Likely from OA. Ordering a right hip x-ray series. Activity as tolerated. Okay to use Tylenol and NSAIDs sparingly. Placing a referral to orthopedics. Health Maintenance Due Topic Date Due  Pneumococcal 0-64 years (1 of 1 - PPSV23) 03/08/1973  PAP AKA CERVICAL CYTOLOGY  03/08/1988  Shingrix Vaccine Age 50> (1 of 2) 03/08/2017  BREAST CANCER SCRN MAMMOGRAM  03/08/2017  
 FOBT Q 1 YEAR AGE 50-75  03/08/2017 Lab review: labs ordered as mentioned above I have discussed the diagnosis with the patient and the intended plan as seen in the above orders. The patient has received an after-visit summary and questions were answered concerning future plans. I have discussed medication side effects and warnings with the patient as well.  I have reviewed the plan of care with the patient, accepted their input and they are in agreement with the treatment goals. All questions were answered. The patient understands the plan of care. Handouts provided today with above information. Pt instructed if symptoms worsen to call the office or report to the ED for continued care. Greater than 50% of the visit time was spent in counseling and/or coordination of care. Voice recognition was used to generate this report, which may have resulted in some phonetic based errors in grammar and contents. Even though attempts were made to correct all the mistakes, some may have been missed, and remained in the body of the document. Follow-up and Dispositions · Return in about 6 weeks (around 6/11/2019) for right knee pain, lab results. Caitlin Solis MD

## 2019-04-30 NOTE — PATIENT INSTRUCTIONS
Patient was given a copy of the Advanced Medical Directive Form, and understands to bring it in once completed. Health Maintenance Due Topic Date Due  Pneumococcal 0-64 years (1 of 1 - PPSV23) 03/08/1973  PAP AKA CERVICAL CYTOLOGY  03/08/1988  Shingrix Vaccine Age 50> (1 of 2) 03/08/2017  BREAST CANCER SCRN MAMMOGRAM  03/08/2017  
 FOBT Q 1 YEAR AGE 50-75  03/08/2017 Knee Pain or Injury: Care Instructions Your Care Instructions Injuries are a common cause of knee problems. Sudden (acute) injuries may be caused by a direct blow to the knee. They can also be caused by abnormal twisting, bending, or falling on the knee. Pain, bruising, or swelling may be severe, and may start within minutes of the injury. Overuse is another cause of knee pain. Other causes are climbing stairs, kneeling, and other activities that use the knee. Everyday wear and tear, especially as you get older, also can cause knee pain. Rest, along with home treatment, often relieves pain and allows your knee to heal. If you have a serious knee injury, you may need tests and treatment. Follow-up care is a key part of your treatment and safety. Be sure to make and go to all appointments, and call your doctor if you are having problems. It's also a good idea to know your test results and keep a list of the medicines you take. How can you care for yourself at home? · Be safe with medicines. Read and follow all instructions on the label. ? If the doctor gave you a prescription medicine for pain, take it as prescribed. ? If you are not taking a prescription pain medicine, ask your doctor if you can take an over-the-counter medicine. · Rest and protect your knee. Take a break from any activity that may cause pain. · Put ice or a cold pack on your knee for 10 to 20 minutes at a time. Put a thin cloth between the ice and your skin.  
· Prop up a sore knee on a pillow when you ice it or anytime you sit or lie down for the next 3 days. Try to keep it above the level of your heart. This will help reduce swelling. · If your knee is not swollen, you can put moist heat, a heating pad, or a warm cloth on your knee. · If your doctor recommends an elastic bandage, sleeve, or other type of support for your knee, wear it as directed. · Follow your doctor's instructions about how much weight you can put on your leg. Use a cane, crutches, or a walker as instructed. · Follow your doctor's instructions about activity during your healing process. If you can do mild exercise, slowly increase your activity. · Reach and stay at a healthy weight. Extra weight can strain the joints, especially the knees and hips, and make the pain worse. Losing even a few pounds may help. When should you call for help? Call 911 anytime you think you may need emergency care. For example, call if: 
  · You have symptoms of a blood clot in your lung (called a pulmonary embolism). These may include: 
? Sudden chest pain. ? Trouble breathing. ? Coughing up blood.  
 Call your doctor now or seek immediate medical care if: 
  · You have severe or increasing pain.  
  · Your leg or foot turns cold or changes color.  
  · You cannot stand or put weight on your knee.  
  · Your knee looks twisted or bent out of shape.  
  · You cannot move your knee.  
  · You have signs of infection, such as: 
? Increased pain, swelling, warmth, or redness. ? Red streaks leading from the knee. ? Pus draining from a place on your knee. ? A fever.  
  · You have signs of a blood clot in your leg (called a deep vein thrombosis), such as: 
? Pain in your calf, back of the knee, thigh, or groin. ? Redness and swelling in your leg or groin.  
 Watch closely for changes in your health, and be sure to contact your doctor if: 
  · You have tingling, weakness, or numbness in your knee.  
  · You have any new symptoms, such as swelling.   · You have bruises from a knee injury that last longer than 2 weeks.  
  · You do not get better as expected. Where can you learn more? Go to http://kaylyn-fara.info/. Enter K195 in the search box to learn more about \"Knee Pain or Injury: Care Instructions. \" Current as of: September 23, 2018 Content Version: 11.9 © 0242-2497 [email or web address]. Care instructions adapted under license by [employer] (which disclaims liability or warranty for this information). If you have questions about a medical condition or this instruction, always ask your healthcare professional. [place] [address] any warranty or liability for your use of this information.

## 2019-05-01 ENCOUNTER — TELEPHONE (OUTPATIENT)
Dept: INTERNAL MEDICINE CLINIC | Age: 54
End: 2019-05-01

## 2019-05-01 LAB
ABSOLUTE LYMPHOCYTE COUNT, 10803: 1.2 K/UL (ref 1–4.8)
ANION GAP SERPL CALC-SCNC: 18 MMOL/L
AVG GLU, 10930: 102 MG/DL (ref 91–123)
BASOPHILS # BLD: 0.1 K/UL (ref 0–0.2)
BASOPHILS NFR BLD: 1 % (ref 0–2)
BUN SERPL-MCNC: 17 MG/DL (ref 6–22)
C-REACTIVE PROTEIN, QT, 006627: 0.1 MG/DL (ref 0–0.5)
CALCIUM SERPL-MCNC: 9.9 MG/DL (ref 8.4–10.5)
CCP ANTIBODY IGG,99138601: <0.5 U/ML
CHLORIDE SERPL-SCNC: 100 MMOL/L (ref 98–110)
CHOLEST SERPL-MCNC: 230 MG/DL (ref 110–200)
CO2 SERPL-SCNC: 25 MMOL/L (ref 20–32)
CREAT SERPL-MCNC: 0.8 MG/DL (ref 0.5–1.2)
EOSINOPHIL # BLD: 0.1 K/UL (ref 0–0.5)
EOSINOPHIL NFR BLD: 2 % (ref 0–6)
ERYTHROCYTE [DISTWIDTH] IN BLOOD BY AUTOMATED COUNT: 14 % (ref 10–15.5)
GFRAA, 66117: >60
GFRNA, 66118: >60
GLUCOSE SERPL-MCNC: 113 MG/DL (ref 70–99)
GRANULOCYTES,GRANS: 67 % (ref 40–75)
HBA1C MFR BLD HPLC: 5.2 % (ref 4.8–5.9)
HCT VFR BLD AUTO: 43.6 % (ref 35.1–48)
HDLC SERPL-MCNC: 43 MG/DL (ref 40–59)
HDLC SERPL-MCNC: 5.3 MG/DL (ref 0–5)
HGB BLD-MCNC: 14 G/DL (ref 11.7–16)
LDLC SERPL CALC-MCNC: 163 MG/DL (ref 50–99)
LYMPHOCYTES, LYMLT: 21 % (ref 20–45)
MCH RBC QN AUTO: 30 PG (ref 26–34)
MCHC RBC AUTO-ENTMCNC: 32 G/DL (ref 31–36)
MCV RBC AUTO: 94 FL (ref 80–95)
MONOCYTES # BLD: 0.5 K/UL (ref 0.1–1)
MONOCYTES NFR BLD: 9 % (ref 3–12)
NEUTROPHILS # BLD AUTO: 3.9 K/UL (ref 1.8–7.7)
PLATELET # BLD AUTO: 282 K/UL (ref 140–440)
PMV BLD AUTO: 11.1 FL (ref 9–13)
POTASSIUM SERPL-SCNC: 3.9 MMOL/L (ref 3.5–5.5)
RBC # BLD AUTO: 4.62 M/UL (ref 3.8–5.2)
SODIUM SERPL-SCNC: 143 MMOL/L (ref 133–145)
T4 FREE SERPL-MCNC: 1.8 NG/DL (ref 0.9–1.8)
TRIGL SERPL-MCNC: 123 MG/DL (ref 40–149)
TSH SERPL DL<=0.005 MIU/L-ACNC: 5.27 MCU/ML (ref 0.27–4.2)
URATE SERPL-MCNC: 6.1 MG/DL (ref 2.2–7.7)
VLDLC SERPL CALC-MCNC: 25 MG/DL (ref 8–30)
WBC # BLD AUTO: 5.7 K/UL (ref 4–11)

## 2019-05-01 NOTE — TELEPHONE ENCOUNTER
----- Message from Isabela Soto MD sent at 5/1/2019  9:44 AM EDT -----  Please let her know that her renal function labs are unremarkable. There is no evidence of gout or rheumatoid arthritis per her recent blood work. Please let her know that her total cholesterol is very high at 230. Her LDL is 163. Her HDL is 43. Her triglycerides are 123. At this point, we can either add a cholesterol-lowering medication or she can try aggressive lifestyle modification measures alone, exercising regularly, losing weight, and reducing her processed/fatty/fried food intakerechecking a lipid panel in 6 months. Please let me know which option she is interested in so that orders can be placed for either lab work or to start a cholesterol-lowering medication. Meanwhile, her free T4 is 1.8 (high normal). Her TSH is slightly elevated at 5.27. Please fax these results to her Endocrinology team for review. Her A1c is 5.2 and normal.  Her CBC is unremarkable.     Dr. Freddie Cartagena  [facility] [address], [address] [place] [address] Str.  Phone: [phone]  Fax: [phone]

## 2019-05-01 NOTE — TELEPHONE ENCOUNTER
Please schedule the pt for a f/u apt to address back pain and weight loss medications.     Dr. Marianne Atwood  [facility] of [facility], [facility], [address] [place] [address].  Phone: [phone]  Fax: [phone]

## 2019-05-01 NOTE — TELEPHONE ENCOUNTER
Called Patient to give lab results per Dr. Marla Hi. Patient wants to exercise and change diet to decrease cholesterol. Patient is aware of modified changes addressed with certain healthy food option and a exercise routine. Patient also wants Gabapentin or Neurontin because the flexeril puts her to sleep and she can't care for her small children. Patient also wants help with a weight lost  Medication (Phentamine).  Patient has been informed that her labs will be faxed over to her endocrinologist.

## 2019-05-01 NOTE — PROGRESS NOTES
Please let her know that her renal function labs are unremarkable. There is no evidence of gout or rheumatoid arthritis per her recent blood work. Please let her know that her total cholesterol is very high at 230. Her LDL is 163. Her HDL is 43. Her triglycerides are 123. At this point, we can either add a cholesterol-lowering medication or she can try aggressive lifestyle modification measures alone, exercising regularly, losing weight, and reducing her processed/fatty/fried food intakerechecking a lipid panel in 6 months. Please let me know which option she is interested in so that orders can be placed for either lab work or to start a cholesterol-lowering medication. Meanwhile, her free T4 is 1.8 (high normal). Her TSH is slightly elevated at 5.27. Please fax these results to her Endocrinology team for review. Her A1c is 5.2 and normal.  Her CBC is unremarkable. Dr. Hermila Burroughs [facility] [address] [place] [address], [facility], [facility] [address] Phone: [phone] Fax: [phone]

## 2019-05-02 ENCOUNTER — HOSPITAL ENCOUNTER (OUTPATIENT)
Dept: GENERAL RADIOLOGY | Age: 54
Discharge: HOME | End: 2019-05-02
Payer: COMMERCIAL

## 2019-05-02 DIAGNOSIS — M25.551 RIGHT HIP PAIN: ICD-10-CM

## 2019-05-02 DIAGNOSIS — M54.5 ACUTE MIDLINE LOW BACK PAIN, WITH SCIATICA PRESENCE UNSPECIFIED: ICD-10-CM

## 2019-05-02 DIAGNOSIS — M25.561 ACUTE PAIN OF RIGHT KNEE: ICD-10-CM

## 2019-05-02 PROCEDURE — 73560 X-RAY EXAM OF KNEE 1 OR 2: CPT

## 2019-05-02 PROCEDURE — 72114 X-RAY EXAM L-S SPINE BENDING: CPT

## 2019-05-02 PROCEDURE — 73502 X-RAY EXAM HIP UNI 2-3 VIEWS: CPT

## 2019-05-02 NOTE — TELEPHONE ENCOUNTER
Please have her scheduled to two apts, first available 30 min slots, for her back pain and to discuss weight loss management options. These rx for these conditions are \"controlled\" and warrant separate apts.      Dr. aZbrina Richards  [facility] of [facility], [address] [place] [address], [address] [place] [address] Str.  Phone: [phone]  Fax: [phone]

## 2019-05-02 NOTE — TELEPHONE ENCOUNTER
Called pt says she was just here 2 days ago and doesn't understand why she would need to come back for a different med that isn't as strong. Says the med she got while here for her appt makes her sleepy and she just can't take it during the day. Says there seems to be a lack of communication between the nurses and the doctor. Says she was here and they had a problem getting her labs. She left and then got a call about scheduling her appt. Says there seems to be a disconnect somewhere. She says she is scheduled back in the beginning of June as instructed.

## 2019-05-02 NOTE — TELEPHONE ENCOUNTER
Patient returned call, and she was given information below per DR. Venecia Beltre. Patient then stated, \"I cant talk right now, I will call you back\".

## 2019-05-03 ENCOUNTER — TELEPHONE (OUTPATIENT)
Dept: INTERNAL MEDICINE CLINIC | Age: 54
End: 2019-05-03

## 2019-05-03 NOTE — TELEPHONE ENCOUNTER
I asked her to come in on Monday for an apt to f/u after her last apt. We will discuss her results from her xrays at that time. She agreed to come in on Monday at noon.     Dr. Keith Stoddard  [facility] of [facility], [facility], [address] [place] [address] Str.  Phone: [phone]  Fax: [phone]

## 2019-05-06 ENCOUNTER — HOSPITAL ENCOUNTER (OUTPATIENT)
Dept: MAMMOGRAPHY | Age: 54
End: 2019-05-06
Attending: INTERNAL MEDICINE
Payer: COMMERCIAL

## 2019-05-06 ENCOUNTER — HOSPITAL ENCOUNTER (OUTPATIENT)
Age: 54
Discharge: HOME | End: 2019-05-06
Attending: INTERNAL MEDICINE
Payer: COMMERCIAL

## 2019-05-06 ENCOUNTER — TELEPHONE (OUTPATIENT)
Dept: INTERNAL MEDICINE CLINIC | Age: 54
End: 2019-05-06

## 2019-05-06 ENCOUNTER — OFFICE VISIT (OUTPATIENT)
Dept: INTERNAL MEDICINE CLINIC | Age: 54
End: 2019-05-06

## 2019-05-06 ENCOUNTER — HOSPITAL ENCOUNTER (OUTPATIENT)
Dept: ULTRASOUND IMAGING | Age: 54
End: 2019-05-06
Attending: INTERNAL MEDICINE
Payer: COMMERCIAL

## 2019-05-06 VITALS
DIASTOLIC BLOOD PRESSURE: 81 MMHG | TEMPERATURE: 97.5 F | HEART RATE: 85 BPM | WEIGHT: 207 LBS | OXYGEN SATURATION: 98 % | RESPIRATION RATE: 16 BRPM | BODY MASS INDEX: 36.68 KG/M2 | HEIGHT: 63 IN | SYSTOLIC BLOOD PRESSURE: 140 MMHG

## 2019-05-06 DIAGNOSIS — E89.0 POSTOPERATIVE HYPOTHYROIDISM: ICD-10-CM

## 2019-05-06 DIAGNOSIS — M25.551 RIGHT HIP PAIN: Primary | ICD-10-CM

## 2019-05-06 DIAGNOSIS — R22.41 HIP MASS, RIGHT: ICD-10-CM

## 2019-05-06 DIAGNOSIS — N93.9 VAGINAL BLEEDING: ICD-10-CM

## 2019-05-06 DIAGNOSIS — M51.9 LUMBAR DISC DISEASE: ICD-10-CM

## 2019-05-06 DIAGNOSIS — E78.49 OTHER HYPERLIPIDEMIA: ICD-10-CM

## 2019-05-06 DIAGNOSIS — M25.551 RIGHT HIP PAIN: ICD-10-CM

## 2019-05-06 PROCEDURE — A9575 INJ GADOTERATE MEGLUMI 0.1ML: HCPCS | Performed by: INTERNAL MEDICINE

## 2019-05-06 PROCEDURE — 74011636320 HC RX REV CODE- 636/320: Performed by: INTERNAL MEDICINE

## 2019-05-06 PROCEDURE — 73723 MRI JOINT LWR EXTR W/O&W/DYE: CPT

## 2019-05-06 RX ORDER — METHYLPREDNISOLONE 4 MG/1
TABLET ORAL
Qty: 1 DOSE PACK | Refills: 0 | Status: SHIPPED | OUTPATIENT
Start: 2019-05-06 | End: 2019-07-24 | Stop reason: ALTCHOICE

## 2019-05-06 RX ADMIN — GADOTERATE MEGLUMINE 20 ML: 376.9 INJECTION INTRAVENOUS at 18:00

## 2019-05-06 NOTE — PROGRESS NOTES
[facility] OF [facility]: 
5/8/2019, MRN: [id number] Shawna Baldwin is a 48 y.o. female and presents to clinic for Follow-up; Results (Xray completed on 5-2-19); and Labs (completed on 4-30-19) Subjective: The pt is a 47yo female with h/o HLD, asthma, lumbar disc disease, and hypothyroidism (postoperative s/p thyroidectomy for multinodular goiter hx; followed by [facility] Endocrinology team). 
  
1. HLD: Her most recent labs show:  Her total cholesterol is very high at 230. Her LDL is 163. Her HDL is 43. Her triglycerides are 123. Her weight is 207lbs. She admitted at her last appointment that she is not regularly exercising secondary to #2. Her most recent labs show that her A1c is 5.2 and normal. 
 
2. Arthralgia: She reported 2-month history of pain along her right knee, right hip, and lower back at her last appointment. Symptoms began after a fall. She also reported that symptoms were unaffected by NSAIDs and a cold compresses. Labs were obtained did not show any evidence of inflammatory arthritis. She was referred to Orthopedics and x-rays were obtained. Findings are listed below. Since her last appointment, she continues to have right-sided hip pain, right low back pain, and right knee pain symptoms have not improved since her last appointment. .  
 
5/2/19 Right Hip Xray: Sclerotic heterogeneous right iliac bone lesion. Malignancy should be excluded. Recommend further evaluation with contrast-enhanced MRI. 
 
5/2/19 Lumbar Xray: Partially visualized large sclerotic/heterogeneous lesion within the right iliac bone. Recommend further evaluation with dedicated CT and/or MRI. Malignancy not excluded. Multilevel degenerative findings as discussed. Spondylolisthesis and alignment as discussed. Presumably developmental and/or degenerative vertebral body height loss at the thoracolumbar junction. Correlate with point tenderness. 3.  Hypothyroidism: She is followed by Endocrinology. She has a history of multinodular goiter, relieved with a thyroidectomy. Her most recent labs show that her T4 was 1.8 high normal.  Her TSH was mildly elevated at 5.27. 
 
4.  Vaginal Bleeding: She had isolated episodes of vaginal bleeding within the past year. No vaginal pain. No vaginal discharge. She has a history of a total abdominal hysterectomy. Bleeding was not associated with intercourse. Patient Active Problem List  
 Diagnosis Date Noted  Right hand pain 09/19/2018  Severe obesity (BMI 35.0-39.9) 09/19/2018  Hyperlipidemia 09/18/2018  Postoperative hypothyroidism 09/18/2018  Mild intermittent asthma without complication 41/31/2475 Current Outpatient Medications Medication Sig Dispense Refill  methylPREDNISolone (MEDROL DOSEPACK) 4 mg tablet Take per package instructions 1 Dose Pack 0  
 estradiol (VIVELLE) 0.05 mg/24 hr 1 Patch by TransDERmal route Every Saturday. 0  
 levothyroxine (SYNTHROID) 150 mcg tablet TAKE 1 TABLET BY MOUTH MONDAY THROUGH SATURDAY AND 3 TABLETS ON SUNDAY, 30 MINUTES TO 1 HOUR BEFORE BREAKFAST  cyclobenzaprine (FLEXERIL) 5 mg tablet Take 1 Tab by mouth three (3) times daily as needed for Muscle Spasm(s). 30 Tab 3  
 budesonide (PULMICORT FLEXHALER) 90 mcg/actuation aepb inhaler Take 1 Puff by inhalation two (2) times a day. 1 Inhaler 0  
 ibuprofen 200 mg cap Take 800 mg by mouth three (3) times daily as needed.  albuterol (PROVENTIL HFA, VENTOLIN HFA, PROAIR HFA) 90 mcg/actuation inhaler Inhale 2 puffs into the lungs every 6 hours as needed.  levothyroxine (SYNTHROID) 150 mcg tablet Take 1 tablet Mon-Sat and 1.5 tabs on Sunday. Take 30 minutes to 1 hour before breakfast    
 
 
Allergies Allergen Reactions  Codeine Nausea and Vomiting and Other (comments)  Hydrocodone Other (comments)  Hydromorphone Other (comments)  Methylphenidate Hcl Other (comments) Heart palpations Heart palpations  Morphine Nausea Only and Other (comments)  Other Medication Nausea and Vomiting All opiods  Oxycodone Other (comments)  Penicillin G Other (comments)  Penicillins Other (comments) Pt doesn't recall reaction. Was told she had a reaction when she was a child Pt doesn't recall reaction. Was told she had a reaction when she was a child RASH  Tramadol Other (comments) Past Medical History:  
Diagnosis Date  Abnormal EKG  Abnormal Pap smear of cervix  Anemia  Asthma  Depression  Elevated BP without diagnosis of hypertension  Headache  Hypercholesterolemia  Thyroid disease Past Surgical History:  
Procedure Laterality Date  HX APPENDECTOMY  HX ADAMA AND BSO  HX THYROIDECTOMY  HX WISDOM TEETH EXTRACTION    
 x4 Family History Problem Relation Age of Onset  Parkinson's Disease Mother  Dementia Mother  Thyroid Cancer Mother Brien Luria Cataract Mother  Hypertension Father  Diabetes Father  Arthritis-osteo Father  Atrial Fibrillation Brother  Hypertension Brother  Macular Degen Maternal Grandmother  Thyroid Cancer Maternal Grandmother  Cataract Maternal Grandmother  No Known Problems Maternal Grandfather  Arthritis-rheumatoid Paternal Grandmother  Diabetes Paternal Grandfather  Attention Deficit Hyperactivity Disorder Son  Anxiety Daughter  Thyroid Disease Daughter Social History Tobacco Use  Smoking status: Never Smoker  Smokeless tobacco: Never Used Substance Use Topics  Alcohol use: Yes Alcohol/week: 0.0 - 0.6 oz  
 
 
ROS Review of Systems Constitutional: Negative for chills and fever. HENT: Negative for ear pain and sore throat. Eyes: Negative for blurred vision and pain. Respiratory: Negative for cough and shortness of breath. Cardiovascular: Negative for chest pain. Gastrointestinal: Negative for abdominal pain, blood in stool and melena. Genitourinary: Negative for dysuria and hematuria. Musculoskeletal: Positive for back pain and joint pain. Negative for myalgias. See HPI Skin: Negative for rash. Neurological: Negative for tingling, focal weakness and headaches. Endo/Heme/Allergies: Does not bruise/bleed easily. Psychiatric/Behavioral: Negative for substance abuse. Objective Vitals:  
 05/06/19 1216 BP: 140/81 Pulse: 85 Resp: 16 Temp: 97.5 °F (36.4 °C) TempSrc: Oral  
SpO2: 98% Weight: 207 lb (93.9 kg) Height: 5' 2.9\" (1.598 m) PainSc:   8 PainLoc: Knee Physical Exam  
Constitutional: She is oriented to person, place, and time and well-developed, well-nourished, and in no distress. HENT:  
Head: Normocephalic and atraumatic. Right Ear: External ear normal.  
Left Ear: External ear normal.  
Nose: Nose normal.  
Mouth/Throat: Oropharynx is clear and moist. No oropharyngeal exudate. Eyes: Pupils are equal, round, and reactive to light. Conjunctivae and EOM are normal. Right eye exhibits no discharge. Left eye exhibits no discharge. No scleral icterus. Neck: Neck supple. Cardiovascular: Normal rate, regular rhythm, normal heart sounds and intact distal pulses. Exam reveals no gallop and no friction rub. No murmur heard. Pulmonary/Chest: Effort normal and breath sounds normal. No respiratory distress. She has no wheezes. She has no rales. Abdominal: Soft. Bowel sounds are normal. She exhibits no distension. There is no tenderness. There is no rebound and no guarding. Musculoskeletal: She exhibits no edema or tenderness (BUE). She has pain with range of motion exercises along her right hip area. No effusions on exam. Lymphadenopathy:  
  She has no cervical adenopathy. Neurological: She is alert and oriented to person, place, and time. She exhibits normal muscle tone.  Gait normal.  
 Skin: Skin is warm and dry. No erythema. Psychiatric: Affect normal.  
Nursing note and vitals reviewed. LABS Data Review:  
Lab Results Component Value Date/Time WBC 5.7 04/30/2019 10:35 AM  
 HGB 14.0 04/30/2019 10:35 AM  
 HCT 43.6 04/30/2019 10:35 AM  
 PLATELET 310 89/63/1378 10:35 AM  
 MCV 94 04/30/2019 10:35 AM  
 
 
Lab Results Component Value Date/Time Sodium 143 04/30/2019 10:35 AM  
 Potassium 3.9 04/30/2019 10:35 AM  
 Chloride 100 04/30/2019 10:35 AM  
 CO2 25 04/30/2019 10:35 AM  
 Anion gap 18.0 04/30/2019 10:35 AM  
 Glucose 113 (H) 04/30/2019 10:35 AM  
 BUN 17 04/30/2019 10:35 AM  
 Creatinine 0.8 04/30/2019 10:35 AM  
 Calcium 9.9 04/30/2019 10:35 AM  
 
 
Lab Results Component Value Date/Time Cholesterol, total 230 (H) 04/30/2019 10:35 AM  
 HDL Cholesterol 43 04/30/2019 10:35 AM  
 LDL, calculated 163 (H) 04/30/2019 10:35 AM  
 VLDL, calculated 25 04/30/2019 10:35 AM  
 Triglyceride 123 04/30/2019 10:35 AM  
 
 
Lab Results Component Value Date/Time Hemoglobin A1c 5.2 04/30/2019 10:35 AM  
 
 
Assessment/Plan: 1. HLD: 
 Checking a lipid panel in 6 months.  I encouraged her to reduce her weight by limiting her processed food intake and reducing her fatty/fried food intake. We will discuss weight loss medication at some point had a future appointment if time permits as the patient desires. 2.  Hypothyroidism: TSH is mildly elevated whereas her free T4 is high normal. 
I encouraged her to follow-up with her Endocrinology team.  Her most recent results were faxed to her Endocrine team. 
 
3.  Right hip mass: Most rule out malignancy. +H/o disc disease per xray findings Stat MRI of her hip ordered. Activity as tolerated.  Steroid course prescribed given lumbar disc disease suggested by x-ray findings along her lumbar spine as well.  She was given copies of her x-ray reports. 4.  Vaginal Bleeding:  Referral placed to GYN team for a pelvic exam and evaluation of bleeding status post hysterectomy. Health Maintenance Due Topic Date Due  Pneumococcal 0-64 years (1 of 1 - PPSV23) 03/08/1973  PAP AKA CERVICAL CYTOLOGY  03/08/1988  Shingrix Vaccine Age 50> (1 of 2) 03/08/2017  BREAST CANCER SCRN MAMMOGRAM  03/08/2017  
 FOBT Q 1 YEAR AGE 50-75  03/08/2017 Lab review: labs are reviewed in the EHR and ordered as mentioned above I have discussed the diagnosis with the patient and the intended plan as seen in the above orders. The patient has received an after-visit summary and questions were answered concerning future plans. I have discussed medication side effects and warnings with the patient as well. I have reviewed the plan of care with the patient, accepted their input and they are in agreement with the treatment goals. All questions were answered. The patient understands the plan of care. Handouts provided today with above information. Pt instructed if symptoms worsen to call the office or report to the ED for continued care. Greater than 50% of the visit time was spent in counseling and/or coordination of care. Voice recognition was used to generate this report, which may have resulted in some phonetic based errors in grammar and contents. Even though attempts were made to correct all the mistakes, some may have been missed, and remained in the body of the document.  
 
 
 
 
Ana María Roque MD

## 2019-05-06 NOTE — TELEPHONE ENCOUNTER
Gold Cervantes is calling from [facility]. She is calling you back regarding this patient Stating she just spoke to you. She is about to go on break. She asks that you call Charlie Villegas.      [phone]

## 2019-05-06 NOTE — PATIENT INSTRUCTIONS
Hyperlipidemia: After Your Visit Your Care Instructions Hyperlipidemia is too much fat in your blood. The body has several kinds of fat, including cholesterol and triglycerides. Your body needs fat for many things, such as making new cells. But too much fat in your blood increases your chances of having a heart attack or stroke. You may be able to lower your cholesterol and triglycerides with a heart-healthy diet, exercise, and if needed, medicine. Your doctor may want you to try lifestyle changes first to see whether they lower the fat in your blood. You may need to take medicine if lifestyle changes do not lower the fat in your blood enough. Follow-up care is a key part of your treatment and safety. Be sure to make and go to all appointments, and call your doctor if you are having problems. Its also a good idea to know your test results and keep a list of the medicines you take. How can you care for yourself at home? Take your medicines · Take your medicines exactly as prescribed. Call your doctor if you think you are having a problem with your medicine. · If you take medicine to lower your cholesterol, go to follow-up visits. You will need to have blood tests. · Do not take large doses of niacin, which is a B vitamin, while taking medicine called statins. It may increase the chance of muscle pain and liver problems. · Talk to your doctor about avoiding grapefruit juice if you are taking statins. Grapefruit juice can raise the level of this medicine in your blood. This could increase side effects. Eat more fruits, vegetables, and fiber · Fruits and vegetables have lots of nutrients that help protect against heart disease, and they have littleif anyfat. Try to eat at least five servings a day. Dark green, deep orange, or yellow fruits and vegetables are healthy choices. · Keep carrots, celery, and other veggies handy for snacks.  Buy fruit that is in season and store it where you can see it so that you will be tempted to eat it. Cook dishes that have a lot of veggies in them, such as stir-fries and soups. · Foods high in fiber may reduce your cholesterol and provide important vitamins and minerals. High-fiber foods include whole-grain cereals and breads, oatmeal, beans, brown rice, citrus fruits, and apples. · Buy whole-grain breads and cereals instead of white bread and pastries. Limit saturated fat · Read food labels and try to avoid saturated fat and trans fat. They increase your risk of heart disease. · Use olive or canola oil when you cook. Try cholesterol-lowering spreads, such as Benecol or Take Control. · Bake, broil, grill, or steam foods instead of frying them. · Limit the amount of high-fat meats you eat, including hot dogs and sausages. Cut out all visible fat when you prepare meat. · Eat fish, skinless poultry, and soy products such as tofu instead of high-fat meats. Soybeans may be especially good for your heart. Eat at least two servings of fish a week. Certain fish, such as salmon, contain omega-3 fatty acids, which may help reduce your risk of heart attack. · Choose low-fat or fat-free milk and dairy products. Get exercise, limit alcohol, and quit smoking · Get more exercise. Work with your doctor to set up an exercise program. Even if you can do only a small amount, exercise will help you get stronger, have more energy, and manage your weight and your stress. Walking is an easy way to get exercise. Gradually increase the amount you walk every day. Aim for at least 30 minutes on most days of the week. You also may want to swim, bike, or do other activities. · Limit alcohol to no more than 2 drinks a day for men and 1 drink a day for women. · Do not smoke. If you need help quitting, talk to your doctor about stop-smoking programs and medicines. These can increase your chances of quitting for good. When should you call for help? Call 911 anytime you think you may need emergency care. For example, call if: 
· You have symptoms of a heart attack. These may include: ¨ Chest pain or pressure, or a strange feeling in the chest. 
¨ Sweating. ¨ Shortness of breath. ¨ Nausea or vomiting. ¨ Pain, pressure, or a strange feeling in the back, neck, jaw, or upper belly or in one or both shoulders or arms. ¨ Lightheadedness or sudden weakness. ¨ A fast or irregular heartbeat. After you call 911, the  may tell you to chew 1 adult-strength or 2 to 4 low-dose aspirin. Wait for an ambulance. Do not try to drive yourself. · You have signs of a stroke. These may include: 
¨ Sudden numbness, paralysis, or weakness in your face, arm, or leg, especially on only one side of your body. ¨ New problems with walking or balance. ¨ Sudden vision changes. ¨ Drooling or slurred speech. ¨ New problems speaking or understanding simple statements, or feeling confused. ¨ A sudden, severe headache that is different from past headaches. · You passed out (lost consciousness). Call your doctor now or seek immediate medical care if: 
· You have muscle pain or weakness. Watch closely for changes in your health, and be sure to contact your doctor if: 
· You are very tired. · You have an upset stomach, gas, constipation, or belly pain or cramps. Where can you learn more? Go to [employer] Enter C406 in the search box to learn more about \"Hyperlipidemia: After Your Visit. \"  
© 8190-0203 [employer]wise, [employer]. Care instructions adapted under license by [employer] (which disclaims liability or warranty for this information).  This care instruction is for use with your licensed healthcare professional. If you have questions about a medical condition or this instruction, always ask your healthcare professional. [place] [address] any warranty or liability for your use of this information. Content Version: 0.0.533428; Last Revised: October 13, 2011 Results for Amie Gill (MRN [id number]) as of 5/6/2019 12:36 Ref. Range 4/30/2019 10:35  
T4, Free Latest Ref Range: 0.9 - 1.8 ng/dL 1.8 TSH Latest Ref Range: 0.27 - 4.20 mcU/mL 5.27 (H)

## 2019-05-07 ENCOUNTER — TELEPHONE (OUTPATIENT)
Dept: INTERNAL MEDICINE CLINIC | Age: 54
End: 2019-05-07

## 2019-05-07 NOTE — TELEPHONE ENCOUNTER
----- Message from Jewell Mcclain MD sent at 5/7/2019  1:41 PM EDT -----  Please let her know that her hip lesion is likely from Paget's disease. She should discuss treatment options with her Endocrinology team.  At this time, no additional imaging studies or referrals are warranted. Paget's disease is typically treated with similar medications that are used to treat osteoporosis. Will defer medication management recommendations to her Endocrine team.  Please fax her MRI findings to her Endocrine team along with her most recent lab results.     Dr. Tanya Pickens  [facility] of [address], [address] [place] [address] Str.  Phone: [phone]  Fax: [phone]

## 2019-05-07 NOTE — PROGRESS NOTES
Please let her know that her hip lesion is likely from Paget's disease. She should discuss treatment options with her Endocrinology team.  At this time, no additional imaging studies or referrals are warranted. Paget's disease is typically treated with similar medications that are used to treat osteoporosis. Will defer medication management recommendations to her Endocrine team.  Please fax her MRI findings to her Endocrine team along with her most recent lab results.     Dr. Ligia Treadwell  [facility] of [address], [facility]  Phone: [phone]  Fax: [phone]

## 2019-05-07 NOTE — TELEPHONE ENCOUNTER
Patient contacted, patient identified with two identifiers (Name & ). Patient aware of results per DR. Mcdowell and verbalizes understanding. Patients MRI and labs faxed to DR. Axel Elizadle.

## 2019-05-08 ENCOUNTER — HOSPITAL ENCOUNTER (OUTPATIENT)
Dept: MAMMOGRAPHY | Age: 54
Discharge: HOME | End: 2019-05-08
Attending: INTERNAL MEDICINE
Payer: COMMERCIAL

## 2019-05-08 ENCOUNTER — HOSPITAL ENCOUNTER (OUTPATIENT)
Dept: ULTRASOUND IMAGING | Age: 54
Discharge: HOME | End: 2019-05-08
Attending: INTERNAL MEDICINE
Payer: COMMERCIAL

## 2019-05-08 ENCOUNTER — TELEPHONE (OUTPATIENT)
Dept: INTERNAL MEDICINE CLINIC | Age: 54
End: 2019-05-08

## 2019-05-08 DIAGNOSIS — M25.561 ACUTE PAIN OF RIGHT KNEE: ICD-10-CM

## 2019-05-08 DIAGNOSIS — Z12.31 VISIT FOR SCREENING MAMMOGRAM: ICD-10-CM

## 2019-05-08 PROCEDURE — 77063 BREAST TOMOSYNTHESIS BI: CPT

## 2019-05-08 PROCEDURE — 76882 US LMTD JT/FCL EVL NVASC XTR: CPT

## 2019-05-08 NOTE — TELEPHONE ENCOUNTER
Chief Complaint   Patient presents with   Patrickiana Alert     Tech at [facility] needs clarification on US EXT NONVAS Right Per the order 4-30-19 05-08-19 [facility] Tech Magdalena called, and 2 identifiers were used: Full Name, and Date of Birth on the patient verified. Magdalena needs to know if Dr Venecia Beltre wants her to rule out Bakers Cyst?  The patient is telling the Tech she is worried about a Tear behind the Knee. Dr Venecia Beltre did a verbal agreement for the Tech to complete the exam, and it is ok to rule out Bakers Cyst.  Dr Venecia Beltre will discuss all results with the patient once received, and reviewed. Magdalena will relay message to the patient. All understood.

## 2019-05-13 NOTE — PROGRESS NOTES
Please let her know that her mammogram does not show any suspicious masses.      Dr. Mono Talbot  [facility] of [facility], [address] [facility], [facility]  Phone: [phone]  Fax: [phone]

## 2019-05-13 NOTE — PROGRESS NOTES
Please let her know that the right ultrasound of her popliteal fossa behind her knee does not show any evidence of a Baker's cyst.  She is to follow-up with Orthopedics for further testing/evaluation of persistent right knee pain.     Dr. Les Elizalde  [facility] of [address], [address] [place] [address] Str.  Phone: [phone]  Fax: [phone]

## 2019-05-14 LAB
MISCELLANEOUS TEST,99000: NORMAL
SENT TO, 434: NORMAL
TEST NAME, 435: NORMAL

## 2019-05-30 ENCOUNTER — TELEPHONE (OUTPATIENT)
Dept: INTERNAL MEDICINE CLINIC | Age: 54
End: 2019-05-30

## 2019-05-30 NOTE — TELEPHONE ENCOUNTER
IN  [facility] ER yesterday for knee- new injury- she is still waiting for MRI appt- its 06/07- do you need to see her since it is reinjured- can't put any pressure on her foot- gave her Hydrocodone and phenegran- please advise

## 2019-05-31 NOTE — TELEPHONE ENCOUNTER
Chief Complaint   Patient presents with    Knee Pain     per Dr Vladimir Goss the patient is to contact her Orhopedic Specialists to follow up with her Knee pain     5-31-19 Patient reached and 2 identifiers were used: Full Name, and Date of Birth, verified. Per Dr Vladimir Goss the patient directed to contact her Orthopedic Specialist to follow up on the Knee Pain, and to follow up within 2 weeks with Dr Vladimir Goss as she was directed by the Emergency Room Physician for the reason she went to [place] [address] ER/Knee pain. The patient also understands that we would only be seeing her for that Emergency Room visit, and Dr Vladimir Goss will follow up with her at her later appointment that is scheduled on 7-24-19 for her regular follow up. The patient understands all.

## 2019-05-31 NOTE — TELEPHONE ENCOUNTER
Please have her f/u with Orthopedics. We do not have records of her ED visit yesterday. Please get records for review.     Respectfully,  Dr. Birder Cushing  [facility] of [facility], [address] [place] [address], [address] [place] [address] Str.  Phone: [phone]  Fax: [phone]

## 2019-06-12 ENCOUNTER — TELEPHONE (OUTPATIENT)
Dept: INTERNAL MEDICINE CLINIC | Age: 54
End: 2019-06-12

## 2019-06-12 NOTE — TELEPHONE ENCOUNTER
Called and spoke to patient about referral to Gynecology. Due to [facility] is Closing and per [employer] to reach out to the patients to see if they are still needing the referral.  If the patient is still needing the referral to send it to another OBGYN. Patient stated that she is currently not having the vaginal bleeding and it was only a 1 time thing when she reported it to Dr. Kyra Harrison. Patient stated that she has seen Dr. Dalila Fuentes in the past and would like the referral to go to that office.   Patient stated that she is going to be having a surgery on 06/21/2019 and then after she has recovered then she will schedule an appointment with Dr. Jeffrey Schafer for this referral.

## 2019-07-24 ENCOUNTER — OFFICE VISIT (OUTPATIENT)
Dept: INTERNAL MEDICINE CLINIC | Age: 54
End: 2019-07-24

## 2019-07-24 VITALS
SYSTOLIC BLOOD PRESSURE: 136 MMHG | TEMPERATURE: 97.2 F | OXYGEN SATURATION: 97 % | BODY MASS INDEX: 36.79 KG/M2 | HEIGHT: 63 IN | DIASTOLIC BLOOD PRESSURE: 94 MMHG | RESPIRATION RATE: 12 BRPM | HEART RATE: 80 BPM

## 2019-07-24 DIAGNOSIS — M25.561 CHRONIC PAIN OF RIGHT KNEE: ICD-10-CM

## 2019-07-24 DIAGNOSIS — E78.5 HYPERLIPIDEMIA, UNSPECIFIED HYPERLIPIDEMIA TYPE: ICD-10-CM

## 2019-07-24 DIAGNOSIS — E89.0 POSTOPERATIVE HYPOTHYROIDISM: Primary | ICD-10-CM

## 2019-07-24 DIAGNOSIS — E66.01 SEVERE OBESITY WITH BODY MASS INDEX (BMI) OF 35.0 TO 39.9 WITH SERIOUS COMORBIDITY (HCC): ICD-10-CM

## 2019-07-24 DIAGNOSIS — G89.29 CHRONIC PAIN OF RIGHT KNEE: ICD-10-CM

## 2019-07-24 DIAGNOSIS — R58 BLEEDING: ICD-10-CM

## 2019-07-24 DIAGNOSIS — J30.9 ALLERGIC RHINITIS, UNSPECIFIED SEASONALITY, UNSPECIFIED TRIGGER: ICD-10-CM

## 2019-07-24 RX ORDER — KETOROLAC TROMETHAMINE 10 MG/1
10 TABLET, FILM COATED ORAL
COMMUNITY
Start: 2019-06-21 | End: 2019-07-24 | Stop reason: ALTCHOICE

## 2019-07-24 RX ORDER — DESVENLAFAXINE 100 MG/1
100 TABLET, EXTENDED RELEASE ORAL DAILY
COMMUNITY
Start: 2019-05-15

## 2019-07-24 RX ORDER — HYDROCODONE BITARTRATE AND ACETAMINOPHEN 5; 325 MG/1; MG/1
TABLET ORAL
Refills: 0 | COMMUNITY
Start: 2019-05-29 | End: 2019-07-24 | Stop reason: ALTCHOICE

## 2019-07-24 RX ORDER — ONDANSETRON 8 MG/1
8 TABLET, ORALLY DISINTEGRATING ORAL
COMMUNITY
Start: 2019-06-21 | End: 2019-07-24 | Stop reason: ALTCHOICE

## 2019-07-24 RX ORDER — PROMETHAZINE HYDROCHLORIDE 25 MG/1
25 TABLET ORAL
COMMUNITY
Start: 2016-10-19 | End: 2019-07-24 | Stop reason: ALTCHOICE

## 2019-07-24 RX ORDER — OXYCODONE HYDROCHLORIDE 5 MG/1
5 TABLET ORAL
COMMUNITY
Start: 2019-06-21 | End: 2019-07-24 | Stop reason: ALTCHOICE

## 2019-07-24 RX ORDER — KETOROLAC TROMETHAMINE 10 MG/1
TABLET, FILM COATED ORAL
COMMUNITY
Start: 2019-06-21 | End: 2019-07-24 | Stop reason: SDUPTHER

## 2019-07-24 RX ORDER — LEVOTHYROXINE SODIUM 150 UG/1
TABLET ORAL
COMMUNITY
Start: 2018-10-23

## 2019-07-24 NOTE — PROGRESS NOTES
[facility] OF [facility]:  7/24/2019, MRN: [id number]      Chester Onofre is a 48 y.o. female and presents to clinic for Cholesterol Problem (follow up  ROOM  3)    Subjective: The pt is a 49yo female with h/o HLD, asthma, lumbar disc disease, and hypothyroidism (postoperative s/p thyroidectomy for multinodular goiter hx; followed by [facility] Endocrinology team). 1. HLD: She never had labs that were ordered after her last apt. in the past, her cholesterol was elevated. She is not really exercising due to #3. She is not dieting. Her weight today is 207 pounds. 2. Hypothyroidism: She is taking 150 mcg of Synthroid. Her last TFTs show that her TSH was mildly elevated at 5.27. She was followed by Endocrinology in the past.  She has not seen them in the past year. Results for Jeremias Starr (MRN [id number]) as of 7/24/2019 12:23   Ref. Range 4/30/2019 10:35   T4, Free Latest Ref Range: 0.9 - 1.8 ng/dL 1.8   TSH Latest Ref Range: 0.27 - 4.20 mcU/mL 5.27 (H)     3. Right Knee Pain: She reports several weeks of right knee pain in which her right knee feels like it gives out. Pain is along her anterior knee. Standing or walking for prolonged periods of time worsens her pain. 6/3/19 Right Knee MRI:  High-grade tear at posterior root ligament attachment of medial meniscus, with partial meniscal extrusion. Advanced patellar cartilage loss, with lesser medial compartment chondromalacia. Small knee joint effusion. 4. Allergic Rhinitis: She reports worsening nasal congestion off and on with sneezing. She is not taking anything over-the-counter on a daily basis. No sick contacts. No fever chills. No sore throat. 5. Vaginal Bleeding: S/p hysterectomy, she reported episodes of vaginal bleeding at the time of her last apt. She was subsequently referred to GYN. She is now stating that she never had vaginal bleeding - stating that the bleeding she originally described was from her rectum.   She was scheduled to see GI but postponed her colonoscopy due to right knee pain. Patient Active Problem List    Diagnosis Date Noted    Right hand pain 09/19/2018    Severe obesity (BMI 35.0-39.9) 09/19/2018    Hyperlipidemia 09/18/2018    Postoperative hypothyroidism 09/18/2018    Mild intermittent asthma without complication 04/16/7180       Current Outpatient Medications   Medication Sig Dispense Refill    Desvenlafaxine 100 mg Tb24 Take 100 mg by mouth daily.  levothyroxine (SYNTHROID) 150 mcg tablet Take 1 tablet Mon-Sat and 2.0 tabs on Sunday. Take 30 minutes to 1 hour before breakfast      estradiol (VIVELLE) 0.05 mg/24 hr 1 Patch by TransDERmal route Every Saturday. 0    budesonide (PULMICORT FLEXHALER) 90 mcg/actuation aepb inhaler Take 1 Puff by inhalation two (2) times a day. 1 Inhaler 0    ibuprofen 200 mg cap Take 800 mg by mouth three (3) times daily as needed.  albuterol (PROVENTIL HFA, VENTOLIN HFA, PROAIR HFA) 90 mcg/actuation inhaler Inhale 2 puffs into the lungs every 6 hours as needed.  promethazine (PHENERGAN) 25 mg tablet Take 25 mg by mouth every eight (8) hours as needed.  ondansetron (ZOFRAN ODT) 8 mg disintegrating tablet Take 8 mg by mouth every eight (8) hours as needed. Allergies   Allergen Reactions    Codeine Nausea and Vomiting and Other (comments)    Hydrocodone Other (comments)    Hydromorphone Other (comments)    Methylphenidate Hcl Other (comments)     Heart palpations  Heart palpations    Morphine Nausea Only and Other (comments)    Other Medication Nausea and Vomiting     All opiods    Oxycodone Other (comments)    Penicillin G Other (comments)    Penicillins Other (comments)     Pt doesn't recall reaction. Was told she had a reaction when she was a child  Pt doesn't recall reaction.  Was told she had a reaction when she was a child  RASH      Tramadol Other (comments)       Past Medical History:   Diagnosis Date    Abnormal EKG  Abnormal Pap smear of cervix     Anemia     Asthma     Depression     Elevated BP without diagnosis of hypertension     Headache     Hypercholesterolemia     Thyroid disease        Past Surgical History:   Procedure Laterality Date    HX APPENDECTOMY      HX ADAMA AND BSO      HX THYROIDECTOMY      HX WISDOM TEETH EXTRACTION      x4       Family History   Problem Relation Age of Onset    Parkinson's Disease Mother     Dementia Mother     Thyroid Cancer Mother     Cataract Mother     Hypertension Father     Diabetes Father     Arthritis-osteo Father     Atrial Fibrillation Brother     Hypertension Brother     Macular Degen Maternal Grandmother     Thyroid Cancer Maternal Grandmother     Cataract Maternal Grandmother     No Known Problems Maternal Grandfather     Arthritis-rheumatoid Paternal Grandmother     Diabetes Paternal Grandfather     Attention Deficit Hyperactivity Disorder Son     Anxiety Daughter     Thyroid Disease Daughter        Social History     Tobacco Use    Smoking status: Never Smoker    Smokeless tobacco: Never Used   Substance Use Topics    Alcohol use: Yes     Alcohol/week: 0.0 - 1.0 standard drinks       ROS   Review of Systems   Constitutional: Negative for chills and fever. HENT: Positive for congestion. Negative for ear pain and sore throat. Eyes: Negative for blurred vision and pain. Respiratory: Negative for cough and shortness of breath. Cardiovascular: Negative for chest pain. Gastrointestinal: Negative for abdominal pain, blood in stool and melena. Genitourinary: Negative for dysuria and hematuria. Musculoskeletal: Positive for joint pain. Negative for myalgias. Skin: Negative for rash. Neurological: Negative for tingling, focal weakness and headaches. Endo/Heme/Allergies: Positive for environmental allergies. Does not bruise/bleed easily. Psychiatric/Behavioral: Negative for substance abuse.        Objective     Vitals: 07/24/19 1212   BP: (!) 128/91   Pulse: 72   Resp: 12   Temp: 97.2 °F (36.2 °C)   TempSrc: Oral   SpO2: 97%   Height: 5' 2.9\" (1.598 m)   PainSc:   4   PainLoc: Knee       Physical Exam   Constitutional: She is oriented to person, place, and time and well-developed, well-nourished, and in no distress. HENT:   Head: Normocephalic and atraumatic. Right Ear: External ear normal.   Left Ear: External ear normal.   Nose: Nose normal.   Mouth/Throat: Oropharynx is clear and moist. No oropharyngeal exudate. Eyes: Pupils are equal, round, and reactive to light. Conjunctivae and EOM are normal. Right eye exhibits no discharge. Left eye exhibits no discharge. No scleral icterus. Neck: Neck supple. Cardiovascular: Normal rate, regular rhythm, normal heart sounds and intact distal pulses. Exam reveals no gallop and no friction rub. No murmur heard. Pulmonary/Chest: Effort normal and breath sounds normal. No respiratory distress. She has no wheezes. She has no rales. Abdominal: Soft. Bowel sounds are normal. She exhibits no distension. There is no tenderness. There is no rebound and no guarding. Musculoskeletal: She exhibits no edema or tenderness (Bue). She has limited ROM 2/2 pain along her right knee   Lymphadenopathy:     She has no cervical adenopathy. Neurological: She is alert and oriented to person, place, and time. She exhibits normal muscle tone. Skin: Skin is warm and dry. No erythema. Psychiatric:   Slightly anxious affect   Nursing note and vitals reviewed.       LABS   Data Review:   Lab Results   Component Value Date/Time    WBC 5.7 04/30/2019 10:35 AM    HGB 14.0 04/30/2019 10:35 AM    HCT 43.6 04/30/2019 10:35 AM    PLATELET 217 26/08/6547 10:35 AM    MCV 94 04/30/2019 10:35 AM       Lab Results   Component Value Date/Time    Sodium 143 04/30/2019 10:35 AM    Potassium 3.9 04/30/2019 10:35 AM    Chloride 100 04/30/2019 10:35 AM    CO2 25 04/30/2019 10:35 AM    Anion gap 18.0 04/30/2019 10:35 AM    Glucose 113 (H) 04/30/2019 10:35 AM    BUN 17 04/30/2019 10:35 AM    Creatinine 0.8 04/30/2019 10:35 AM    Calcium 9.9 04/30/2019 10:35 AM       Lab Results   Component Value Date/Time    Cholesterol, total 230 (H) 04/30/2019 10:35 AM    HDL Cholesterol 43 04/30/2019 10:35 AM    LDL, calculated 163 (H) 04/30/2019 10:35 AM    VLDL, calculated 25 04/30/2019 10:35 AM    Triglyceride 123 04/30/2019 10:35 AM       Lab Results   Component Value Date/Time    Hemoglobin A1c 5.2 04/30/2019 10:35 AM       Assessment/Plan:   1. Right Knee Pain: From a meniscus cartilage injury.  -Continue follow-up with Orthopedics  -Activity as tolerated. 2.  Hypothyroidism:  -I encouraged her to schedule an appointment with her Endocrinology team.  -Continue with Synthroid pending her follow-up appoint with Endocrine. 3. HLD Hx:  - I encouraged her to reduce her weight by limiting her processed food intake. We discussed the importance of reducing her cholesterol and weight. She plans to establish care with a different PCP in August.  I encouraged her to get labs drawn checking her cholesterol with her new PCP. 4. ?Vaginal Bleeding vs Rectal Bleeding:  - I discussed the importance of getting a Pap.  -I encouraged her to get a colonoscopy as well. 5.  General:  -She declined a pneumococcal vaccine. 6.  Allergic rhinitis:  -I encouraged her to use Flonase over-the-counter and an antihistamine. Health Maintenance Due   Topic Date Due    Pneumococcal 0-64 years (1 of 1 - PPSV23) 03/08/1973    PAP AKA CERVICAL CYTOLOGY  03/08/1988    Shingrix Vaccine Age 50> (1 of 2) 03/08/2017    FOBT Q 1 YEAR AGE 50-75  03/08/2017     Lab review: labs are reviewed in the EHR    I have discussed the diagnosis with the patient and the intended plan as seen in the above orders. The patient has received an after-visit summary and questions were answered concerning future plans.   I have discussed medication side effects and warnings with the patient as well. I have reviewed the plan of care with the patient, accepted their input and they are in agreement with the treatment goals. All questions were answered. The patient understands the plan of care. Handouts provided today with above information. Pt instructed if symptoms worsen to call the office or report to the ED for continued care. Greater than 50% of the visit time was spent in counseling and/or coordination of care. Voice recognition was used to generate this report, which may have resulted in some phonetic based errors in grammar and contents. Even though attempts were made to correct all the mistakes, some may have been missed, and remained in the body of the document.           Michelle Wadsworth MD

## 2019-07-24 NOTE — PATIENT INSTRUCTIONS
Health Maintenance Due   Topic Date Due    Pneumococcal 0-64 years (1 of 1 - PPSV23) 03/08/1973    PAP AKA CERVICAL CYTOLOGY  03/08/1988    Shingrix Vaccine Age 50> (1 of 2) 03/08/2017    FOBT Q 1 YEAR AGE 50-75  03/08/2017          Body Mass Index: Care Instructions  Your Care Instructions    Body mass index (BMI) can help you see if your weight is raising your risk for health problems. It uses a formula to compare how much you weigh with how tall you are. · A BMI lower than 18.5 is considered underweight. · A BMI between 18.5 and 24.9 is considered healthy. · A BMI between 25 and 29.9 is considered overweight. A BMI of 30 or higher is considered obese. If your BMI is in the normal range, it means that you have a lower risk for weight-related health problems. If your BMI is in the overweight or obese range, you may be at increased risk for weight-related health problems, such as high blood pressure, heart disease, stroke, arthritis or joint pain, and diabetes. If your BMI is in the underweight range, you may be at increased risk for health problems such as fatigue, lower protection (immunity) against illness, muscle loss, bone loss, hair loss, and hormone problems. BMI is just one measure of your risk for weight-related health problems. You may be at higher risk for health problems if you are not active, you eat an unhealthy diet, or you drink too much alcohol or use tobacco products. Follow-up care is a key part of your treatment and safety. Be sure to make and go to all appointments, and call your doctor if you are having problems. It's also a good idea to know your test results and keep a list of the medicines you take. How can you care for yourself at home? · Practice healthy eating habits. This includes eating plenty of fruits, vegetables, whole grains, lean protein, and low-fat dairy. · If your doctor recommends it, get more exercise. Walking is a good choice.  Bit by bit, increase the amount you walk every day. Try for at least 30 minutes on most days of the week. · Do not smoke. Smoking can increase your risk for health problems. If you need help quitting, talk to your doctor about stop-smoking programs and medicines. These can increase your chances of quitting for good. · Limit alcohol to 2 drinks a day for men and 1 drink a day for women. Too much alcohol can cause health problems. If you have a BMI higher than 25  · Your doctor may do other tests to check your risk for weight-related health problems. This may include measuring the distance around your waist. A waist measurement of more than 40 inches in men or 35 inches in women can increase the risk of weight-related health problems. · Talk with your doctor about steps you can take to stay healthy or improve your health. You may need to make lifestyle changes to lose weight and stay healthy, such as changing your diet and getting regular exercise. If you have a BMI lower than 18.5  · Your doctor may do other tests to check your risk for health problems. · Talk with your doctor about steps you can take to stay healthy or improve your health. You may need to make lifestyle changes to gain or maintain weight and stay healthy, such as getting more healthy foods in your diet and doing exercises to build muscle. Where can you learn more? Go to http://kaylyn-fara.info/. Enter S176 in the search box to learn more about \"Body Mass Index: Care Instructions. \"  Current as of: March 28, 2019  Content Version: 12.1  © 3298-8231 [employer]. Care instructions adapted under license by [employer] (which disclaims liability or warranty for this information). If you have questions about a medical condition or this instruction, always ask your healthcare professional. [place] [address] any warranty or liability for your use of this information.

## 2019-07-24 NOTE — PROGRESS NOTES
Chief Complaint   Patient presents with    Cholesterol Problem     follow up  ROOM  3       1. Have you been to the ER, urgent care clinic since your last visit? Hospitalized since your last visit? No    2. Have you seen or consulted any other health care providers outside of the [address] [place] [address] since your last visit? Include any pap smears or colon screening. No    Patient was given a copy of the Advanced Directive and understands to bring it in once completed.   Health Maintenance Due   Topic Date Due    Pneumococcal 0-64 years (1 of 1 - PPSV23) 03/08/1973    PAP AKA CERVICAL CYTOLOGY  03/08/1988    Shingrix Vaccine Age 50> (1 of 2) 03/08/2017    FOBT Q 1 YEAR AGE 50-75  03/08/2017

## 2019-08-26 ENCOUNTER — DOCUMENTATION ONLY (OUTPATIENT)
Dept: INTERNAL MEDICINE CLINIC | Age: 54
End: 2019-08-26

## 2019-08-26 NOTE — PROGRESS NOTES
Pt is transferring care to [facility], records request received which will be forwarded to Ci for processing